# Patient Record
Sex: MALE | Race: WHITE | Employment: OTHER | URBAN - METROPOLITAN AREA
[De-identification: names, ages, dates, MRNs, and addresses within clinical notes are randomized per-mention and may not be internally consistent; named-entity substitution may affect disease eponyms.]

---

## 2020-12-17 ENCOUNTER — ANESTHESIA EVENT (OUTPATIENT)
Dept: SURGERY | Age: 55
End: 2020-12-17
Payer: COMMERCIAL

## 2020-12-18 ENCOUNTER — ANESTHESIA (OUTPATIENT)
Dept: SURGERY | Age: 55
End: 2020-12-18
Payer: COMMERCIAL

## 2020-12-18 ENCOUNTER — HOSPITAL ENCOUNTER (OUTPATIENT)
Age: 55
Setting detail: OUTPATIENT SURGERY
Discharge: HOME | End: 2020-12-18
Attending: ORTHOPAEDIC SURGERY | Admitting: ORTHOPAEDIC SURGERY
Payer: COMMERCIAL

## 2020-12-18 VITALS
SYSTOLIC BLOOD PRESSURE: 144 MMHG | OXYGEN SATURATION: 93 % | WEIGHT: 235 LBS | HEART RATE: 70 BPM | TEMPERATURE: 98.6 F | BODY MASS INDEX: 31 KG/M2 | RESPIRATION RATE: 14 BRPM | DIASTOLIC BLOOD PRESSURE: 79 MMHG

## 2020-12-18 DIAGNOSIS — L76.82 PAIN AT SURGICAL INCISION: Primary | ICD-10-CM

## 2020-12-18 PROCEDURE — 74011250636 HC RX REV CODE- 250/636: Performed by: NURSE ANESTHETIST, CERTIFIED REGISTERED

## 2020-12-18 PROCEDURE — 74011000250 HC RX REV CODE- 250: Performed by: NURSE ANESTHETIST, CERTIFIED REGISTERED

## 2020-12-18 PROCEDURE — 77030027384 HC PRB ARTHSCP SERFAS STRY -C: Performed by: ORTHOPAEDIC SURGERY

## 2020-12-18 PROCEDURE — 77030040361 HC SLV COMPR DVT MDII -B: Performed by: ORTHOPAEDIC SURGERY

## 2020-12-18 PROCEDURE — 2709999900 HC NON-CHARGEABLE SUPPLY: Performed by: ORTHOPAEDIC SURGERY

## 2020-12-18 PROCEDURE — 77030013367: Performed by: ORTHOPAEDIC SURGERY

## 2020-12-18 PROCEDURE — 77030036560 HC SHLDR ARM PAD ABDUCTN S2SG -B: Performed by: ORTHOPAEDIC SURGERY

## 2020-12-18 PROCEDURE — 77030019605: Performed by: ORTHOPAEDIC SURGERY

## 2020-12-18 PROCEDURE — 77030003666 HC NDL SPINAL BD -A: Performed by: ORTHOPAEDIC SURGERY

## 2020-12-18 PROCEDURE — 77030006891 HC BLD SHV RESECT STRY -B: Performed by: ORTHOPAEDIC SURGERY

## 2020-12-18 PROCEDURE — 74011250637 HC RX REV CODE- 250/637: Performed by: ANESTHESIOLOGY

## 2020-12-18 PROCEDURE — 76010010054 HC POST OP PAIN BLOCK: Performed by: ORTHOPAEDIC SURGERY

## 2020-12-18 PROCEDURE — 77030002933 HC SUT MCRYL J&J -A: Performed by: ORTHOPAEDIC SURGERY

## 2020-12-18 PROCEDURE — 77030002960 HC SUT PASS S&N -C: Performed by: ORTHOPAEDIC SURGERY

## 2020-12-18 PROCEDURE — 77030033005 HC TBNG ARTHSC PMP STRY -B: Performed by: ORTHOPAEDIC SURGERY

## 2020-12-18 PROCEDURE — 77030002916 HC SUT ETHLN J&J -A: Performed by: ORTHOPAEDIC SURGERY

## 2020-12-18 PROCEDURE — 76210000020 HC REC RM PH II FIRST 0.5 HR: Performed by: ORTHOPAEDIC SURGERY

## 2020-12-18 PROCEDURE — 76942 ECHO GUIDE FOR BIOPSY: CPT | Performed by: ORTHOPAEDIC SURGERY

## 2020-12-18 PROCEDURE — C1713 ANCHOR/SCREW BN/BN,TIS/BN: HCPCS | Performed by: ORTHOPAEDIC SURGERY

## 2020-12-18 PROCEDURE — 77030003602 HC NDL NRV BLK BBMI -B: Performed by: ANESTHESIOLOGY

## 2020-12-18 PROCEDURE — 74011250636 HC RX REV CODE- 250/636: Performed by: ANESTHESIOLOGY

## 2020-12-18 PROCEDURE — 76060000033 HC ANESTHESIA 1 TO 1.5 HR: Performed by: ORTHOPAEDIC SURGERY

## 2020-12-18 PROCEDURE — 76210000063 HC OR PH I REC FIRST 0.5 HR: Performed by: ORTHOPAEDIC SURGERY

## 2020-12-18 PROCEDURE — 74011250636 HC RX REV CODE- 250/636: Performed by: ORTHOPAEDIC SURGERY

## 2020-12-18 PROCEDURE — 77030006868 HC BLD SHV AUG STRY -B: Performed by: ORTHOPAEDIC SURGERY

## 2020-12-18 PROCEDURE — 76010000161 HC OR TIME 1 TO 1.5 HR INTENSV-TIER 1: Performed by: ORTHOPAEDIC SURGERY

## 2020-12-18 PROCEDURE — 77030010509 HC AIRWY LMA MSK TELE -A: Performed by: ANESTHESIOLOGY

## 2020-12-18 DEVICE — ICONIX SPEED ANCHOR 2.3MM 2 STRANDS #2 FORCE FIBER SUTURE
Type: IMPLANTABLE DEVICE | Site: SHOULDER | Status: FUNCTIONAL
Brand: ICONIX

## 2020-12-18 RX ORDER — ACETAMINOPHEN 500 MG
1000 TABLET ORAL ONCE
Status: COMPLETED | OUTPATIENT
Start: 2020-12-18 | End: 2020-12-18

## 2020-12-18 RX ORDER — DEXAMETHASONE SODIUM PHOSPHATE 4 MG/ML
INJECTION, SOLUTION INTRA-ARTICULAR; INTRALESIONAL; INTRAMUSCULAR; INTRAVENOUS; SOFT TISSUE AS NEEDED
Status: DISCONTINUED | OUTPATIENT
Start: 2020-12-18 | End: 2020-12-18 | Stop reason: HOSPADM

## 2020-12-18 RX ORDER — EPINEPHRINE 1 MG/ML
INJECTION INTRAMUSCULAR; INTRAVENOUS; SUBCUTANEOUS AS NEEDED
Status: DISCONTINUED | OUTPATIENT
Start: 2020-12-18 | End: 2020-12-18 | Stop reason: HOSPADM

## 2020-12-18 RX ORDER — ROPIVACAINE HYDROCHLORIDE 5 MG/ML
INJECTION, SOLUTION EPIDURAL; INFILTRATION; PERINEURAL
Status: COMPLETED | OUTPATIENT
Start: 2020-12-18 | End: 2020-12-18

## 2020-12-18 RX ORDER — MIDAZOLAM HYDROCHLORIDE 1 MG/ML
2 INJECTION, SOLUTION INTRAMUSCULAR; INTRAVENOUS
Status: DISCONTINUED | OUTPATIENT
Start: 2020-12-18 | End: 2020-12-18 | Stop reason: HOSPADM

## 2020-12-18 RX ORDER — HALOPERIDOL 5 MG/ML
1 INJECTION INTRAMUSCULAR
Status: DISCONTINUED | OUTPATIENT
Start: 2020-12-18 | End: 2020-12-18 | Stop reason: HOSPADM

## 2020-12-18 RX ORDER — ONDANSETRON 2 MG/ML
INJECTION INTRAMUSCULAR; INTRAVENOUS AS NEEDED
Status: DISCONTINUED | OUTPATIENT
Start: 2020-12-18 | End: 2020-12-18 | Stop reason: HOSPADM

## 2020-12-18 RX ORDER — SODIUM CHLORIDE, SODIUM LACTATE, POTASSIUM CHLORIDE, CALCIUM CHLORIDE 600; 310; 30; 20 MG/100ML; MG/100ML; MG/100ML; MG/100ML
75 INJECTION, SOLUTION INTRAVENOUS CONTINUOUS
Status: DISCONTINUED | OUTPATIENT
Start: 2020-12-18 | End: 2020-12-18 | Stop reason: HOSPADM

## 2020-12-18 RX ORDER — ONDANSETRON 2 MG/ML
4 INJECTION INTRAMUSCULAR; INTRAVENOUS
Status: DISCONTINUED | OUTPATIENT
Start: 2020-12-18 | End: 2020-12-18 | Stop reason: HOSPADM

## 2020-12-18 RX ORDER — DEXAMETHASONE SODIUM PHOSPHATE 4 MG/ML
INJECTION, SOLUTION INTRA-ARTICULAR; INTRALESIONAL; INTRAMUSCULAR; INTRAVENOUS; SOFT TISSUE
Status: COMPLETED | OUTPATIENT
Start: 2020-12-18 | End: 2020-12-18

## 2020-12-18 RX ORDER — HYDROMORPHONE HYDROCHLORIDE 2 MG/ML
0.5 INJECTION, SOLUTION INTRAMUSCULAR; INTRAVENOUS; SUBCUTANEOUS
Status: DISCONTINUED | OUTPATIENT
Start: 2020-12-18 | End: 2020-12-18 | Stop reason: HOSPADM

## 2020-12-18 RX ORDER — CEFAZOLIN SODIUM/WATER 2 G/20 ML
2 SYRINGE (ML) INTRAVENOUS ONCE
Status: COMPLETED | OUTPATIENT
Start: 2020-12-18 | End: 2020-12-18

## 2020-12-18 RX ORDER — OXYCODONE HYDROCHLORIDE 5 MG/1
5 TABLET ORAL
Status: DISCONTINUED | OUTPATIENT
Start: 2020-12-18 | End: 2020-12-18 | Stop reason: HOSPADM

## 2020-12-18 RX ORDER — MIDAZOLAM HYDROCHLORIDE 1 MG/ML
2 INJECTION, SOLUTION INTRAMUSCULAR; INTRAVENOUS ONCE
Status: COMPLETED | OUTPATIENT
Start: 2020-12-18 | End: 2020-12-18

## 2020-12-18 RX ORDER — LIDOCAINE HYDROCHLORIDE 20 MG/ML
INJECTION, SOLUTION EPIDURAL; INFILTRATION; INTRACAUDAL; PERINEURAL AS NEEDED
Status: DISCONTINUED | OUTPATIENT
Start: 2020-12-18 | End: 2020-12-18 | Stop reason: HOSPADM

## 2020-12-18 RX ORDER — SODIUM CHLORIDE, SODIUM LACTATE, POTASSIUM CHLORIDE, CALCIUM CHLORIDE 600; 310; 30; 20 MG/100ML; MG/100ML; MG/100ML; MG/100ML
25 INJECTION, SOLUTION INTRAVENOUS CONTINUOUS
Status: DISCONTINUED | OUTPATIENT
Start: 2020-12-18 | End: 2020-12-18 | Stop reason: HOSPADM

## 2020-12-18 RX ORDER — OXYCODONE HYDROCHLORIDE 5 MG/1
5 TABLET ORAL
Qty: 24 TAB | Refills: 0 | Status: SHIPPED | OUTPATIENT
Start: 2020-12-18 | End: 2020-12-21

## 2020-12-18 RX ORDER — LIDOCAINE HYDROCHLORIDE 10 MG/ML
0.1 INJECTION INFILTRATION; PERINEURAL AS NEEDED
Status: DISCONTINUED | OUTPATIENT
Start: 2020-12-18 | End: 2020-12-18 | Stop reason: HOSPADM

## 2020-12-18 RX ORDER — NALOXONE HYDROCHLORIDE 0.4 MG/ML
0.2 INJECTION, SOLUTION INTRAMUSCULAR; INTRAVENOUS; SUBCUTANEOUS AS NEEDED
Status: DISCONTINUED | OUTPATIENT
Start: 2020-12-18 | End: 2020-12-18 | Stop reason: HOSPADM

## 2020-12-18 RX ORDER — NALOXONE HYDROCHLORIDE 0.4 MG/ML
0.2 INJECTION, SOLUTION INTRAMUSCULAR; INTRAVENOUS; SUBCUTANEOUS
Status: DISCONTINUED | OUTPATIENT
Start: 2020-12-18 | End: 2020-12-18 | Stop reason: HOSPADM

## 2020-12-18 RX ORDER — PROPOFOL 10 MG/ML
INJECTION, EMULSION INTRAVENOUS AS NEEDED
Status: DISCONTINUED | OUTPATIENT
Start: 2020-12-18 | End: 2020-12-18 | Stop reason: HOSPADM

## 2020-12-18 RX ORDER — FENTANYL CITRATE 50 UG/ML
100 INJECTION, SOLUTION INTRAMUSCULAR; INTRAVENOUS ONCE
Status: COMPLETED | OUTPATIENT
Start: 2020-12-18 | End: 2020-12-18

## 2020-12-18 RX ADMIN — ACETAMINOPHEN 1000 MG: 500 TABLET, FILM COATED ORAL at 09:07

## 2020-12-18 RX ADMIN — FENTANYL CITRATE 100 MCG: 50 INJECTION, SOLUTION INTRAMUSCULAR; INTRAVENOUS at 09:18

## 2020-12-18 RX ADMIN — MIDAZOLAM 2 MG: 1 INJECTION INTRAMUSCULAR; INTRAVENOUS at 09:18

## 2020-12-18 RX ADMIN — LIDOCAINE HYDROCHLORIDE 80 MG: 20 INJECTION, SOLUTION EPIDURAL; INFILTRATION; INTRACAUDAL; PERINEURAL at 11:29

## 2020-12-18 RX ADMIN — Medication 2 G: at 11:38

## 2020-12-18 RX ADMIN — PROPOFOL 200 MG: 10 INJECTION, EMULSION INTRAVENOUS at 11:29

## 2020-12-18 RX ADMIN — SODIUM CHLORIDE, SODIUM LACTATE, POTASSIUM CHLORIDE, AND CALCIUM CHLORIDE: 600; 310; 30; 20 INJECTION, SOLUTION INTRAVENOUS at 12:30

## 2020-12-18 RX ADMIN — DEXAMETHASONE SODIUM PHOSPHATE 10 MG: 4 INJECTION, SOLUTION INTRAMUSCULAR; INTRAVENOUS at 11:43

## 2020-12-18 RX ADMIN — DEXAMETHASONE SODIUM PHOSPHATE 5 MG: 4 INJECTION, SOLUTION INTRAMUSCULAR; INTRAVENOUS at 09:20

## 2020-12-18 RX ADMIN — ONDANSETRON 4 MG: 2 INJECTION INTRAMUSCULAR; INTRAVENOUS at 11:43

## 2020-12-18 RX ADMIN — SODIUM CHLORIDE, SODIUM LACTATE, POTASSIUM CHLORIDE, AND CALCIUM CHLORIDE 25 ML/HR: 600; 310; 30; 20 INJECTION, SOLUTION INTRAVENOUS at 08:30

## 2020-12-18 RX ADMIN — ROPIVACAINE HYDROCHLORIDE 25 ML: 150 INJECTION, SOLUTION EPIDURAL; INFILTRATION; PERINEURAL at 09:20

## 2020-12-18 NOTE — DISCHARGE INSTRUCTIONS
Post-Operative Instructions   For  Sherice Jameson. Shoulder Rotator Cuff Repair   Phone:  [phone]    1. Apply an  Ice pack to the shoulder. 2. You may shower in three days  after the arthroscopy. Keep dressing in place. It is waterproof. 3. Keep the sling on. You had a rotator cuff repair and it must be protected. Do not raise the arm at all. Be very careful with the arm. 5. Use the pain medication as instructed on the bottle. If you have pain medication from another physician do not use it with this medication. 6. You may have some side effects from your pain medication. If you have nausea, try taking your medication with food. For itching, you may take over the counter Benadryl. 7. If you have a problem, please call [facility] at [phone][facility]         [facility] Call your doctor if pain is NOT relieved by medication.   Excessive bleeding that does not stop after holding pressure over the area  · Temperature of 101 degrees F or above  · Excessive redness, swelling or bruising, and/ or green or yellow, smelly discharge from incision      TYPICAL SIDE EFFECTS OF PAIN MEDICATION:     Constipation: Drink lots of fluids. Over the counter stool softener if needed.    Nausea: Take pain medication with food. Call your doctor with persistent nausea. ACTIVITY  · As tolerated and as directed by your doctor. · Bathe or shower as directed by your doctor. DIET  · Day of surgery: Clear liquids until no nausea or vomiting; small portion, light diet Pitkin foods (ex: baked chicken, plain rice, grits, scrambled eggs, toast). Nothing greasy, fried or spicy today. · Advance to regular diet on second day, unless your doctor orders otherwise. · If nausea and vomiting continues, call your doctor. PAIN  · Take pain medication as directed by your doctor.     · DO NOT take aspirin or blood thinners unless directed by your doctor. AFTER ANESTHESIA   · For the first 24 hours: DO NOT Drive, Drink alcoholic beverages, or Make important decisions. · Be aware of dizziness following anesthesia and while taking pain medication. DISCHARGE SUMMARY from Nurse    PATIENT INSTRUCTIONS:    After general anesthesia or intravenous sedation, for 24 hours or while taking prescription Narcotics:  · Limit your activities  · Do not drive and operate hazardous machinery  · Do not make important personal or business decisions  · Do  not drink alcoholic beverages  · If you have not urinated within 8 hours after discharge, please contact your surgeon on call. *  Please give a list of your current medications to your Primary Care Provider. *  Please update this list whenever your medications are discontinued, doses are      changed, or new medications (including over-the-counter products) are added. *  Please carry medication information at all times in case of emergency situations. Preventing Infection at Home  We care about preventing infection and avoiding the spread of germs - not only when you are in the hospital but also when you return home. When you return home from the hospital, its important to take the following steps to help prevent infection and avoid spreading germs that could infect you and others. Ask everyone in your home to follow these guidelines, too. Clean Your Hands  · Clean your hands whenever your hands are visibly dirty, before you eat, before or after touching your mouth, nose or eyes, and before preparing food. Clean them after contact with body fluids, using the restroom, touching animals or changing diapers. · When washing hands, wet them with warm water and work up a lather. Rub hands for at least 15 seconds, then rinse them and pat them dry with a clean towel or paper towel.   · When using hand sanitizers, it should take about 15 seconds to rub your hands dry. If not, you probably didnt apply enough . Cover Your Sneeze or Cough  Germs are released into the air whenever you sneeze or cough. To prevent the spread of infection:  · Turn away from other people before coughing or sneezing. · Cover your mouth or nose with a tissue when you cough or sneeze. Put the tissue in the trash. · If you dont have a tissue, cough or sneeze into your upper sleeve, not your hands. · Always clean your hands after coughing or sneezing. Care for Wounds  Your skin is your bodys first line of defense against germs, but an open wound leaves an easy way for germs to enter your body. To prevent infection:  · Clean your hands before and after changing wound dressings, and wear gloves to change dressings if recommended by your doctor. · Take special care with IV lines or other devices inserted into the body. If you must touch them, clean your hands first.  · Follow any specific instructions from your doctor to care for your wounds. Contact your doctor if you experience any signs of infection, such as fever or increased redness at the surgical or wound site. Keep a Clean Home  · Clean or wipe commonly touched hard surfaces like door handles, sinks, tabletops, phones and TV remotes. · Use products labeled disinfectant to kill harmful bacteria and viruses. · Use a clean cloth or paper towel to clean and dry surfaces. Wiping surfaces with a dirty dishcloth, sponge or towel will only spread germs. · Never share toothbrushes, dejesus, drinking glasses, utensils, razor blades, face cloths or bath towels to avoid spreading germs. · Be sure that the linens that you sleep on are clean. · Keep pets away from wounds and wash your hands after touching pets, their toys or bedding. We care about you and your health. Remember, preventing infections is a team effort between you, your family, friends and health care providers.        These are general instructions for a healthy lifestyle:    No smoking/ No tobacco products/ Avoid exposure to second hand smoke    Surgeon General's Warning:  Quitting smoking now greatly reduces serious risk to your health. Obesity, smoking, and sedentary lifestyle greatly increases your risk for illness    A healthy diet, regular physical exercise & weight monitoring are important for maintaining a healthy lifestyle    You may be retaining fluid if you have a history of heart failure or if you experience any of the following symptoms:  Weight gain of 3 pounds or more overnight or 5 pounds in a week, increased swelling in our hands or feet or shortness of breath while lying flat in bed. Please call your doctor as soon as you notice any of these symptoms; do not wait until your next office visit. Recognize signs and symptoms of STROKE:    F-face looks uneven    A-arms unable to move or move unevenly    S-speech slurred or non-existent    T-time-call 911 as soon as signs and symptoms begin-DO NOT go       Back to bed or wait to see if you get better-TIME IS BRAIN. Advance Care Planning  People with COVID-19 may have no symptoms, mild symptoms, such as fever, cough, and shortness of breath or they may have more severe illness, developing severe and fatal pneumonia. As a result, Advance Care Planning with attention to naming a health care decision maker (someone you trust to make healthcare decisions for you if you could not speak for yourself) and sharing other health care preferences is important BEFORE a possible health crisis. Please contact your Primary Care Provider to discuss Advance Care Planning.      Preventing the Spread of Coronavirus Disease 2019 in Homes and Residential Communities  For the most recent information go to [employer]    Prevention steps for People with confirmed or suspected COVID-19 (including persons under investigation) who do not need to be hospitalized  and   People with confirmed COVID-19 who were hospitalized and determined to be medically stable to go home    Your healthcare provider and public health staff will evaluate whether you can be cared for at home. If it is determined that you do not need to be hospitalized and can be isolated at home, you will be monitored by staff from your local or state health department. You should follow the prevention steps below until a healthcare provider or local or state health department says you can return to your normal activities. Stay home except to get medical care  People who are mildly ill with COVID-19 are able to isolate at home during their illness. You should restrict activities outside your home, except for getting medical care. Do not go to work, school, or public areas. Avoid using public transportation, ride-sharing, or taxis. Separate yourself from other people and animals in your home  People: As much as possible, you should stay in a specific room and away from other people in your home. Also, you should use a separate bathroom, if available. Animals: You should restrict contact with pets and other animals while you are sick with COVID-19, just like you would around other people. Although there have not been reports of pets or other animals becoming sick with COVID-19, it is still recommended that people sick with COVID-19 limit contact with animals until more information is known about the virus. When possible, have another member of your household care for your animals while you are sick. If you are sick with COVID-19, avoid contact with your pet, including petting, snuggling, being kissed or licked, and sharing food. If you must care for your pet or be around animals while you are sick, wash your hands before and after you interact with pets and wear a facemask.   Call ahead before visiting your doctor  If you have a medical appointment, call the healthcare provider and tell them that you have or may have COVID-19. This will help the healthcare providers office take steps to keep other people from getting infected or exposed. Wear a facemask  You should wear a facemask when you are around other people (e.g., sharing a room or vehicle) or pets and before you enter a healthcare providers office. If you are not able to wear a facemask (for example, because it causes trouble breathing), then people who live with you should not stay in the same room with you, or they should wear a facemask if they enter your room. Cover your coughs and sneezes  Cover your mouth and nose with a tissue when you cough or sneeze. Throw used tissues in a lined trash can. Immediately wash your hands with soap and water for at least 20 seconds or, if soap and water are not available, clean your hands with an alcohol-based hand  that contains at least 60% alcohol. Clean your hands often  Wash your hands often with soap and water for at least 20 seconds, especially after blowing your nose, coughing, or sneezing; going to the bathroom; and before eating or preparing food. If soap and water are not readily available, use an alcohol-based hand  with at least 60% alcohol, covering all surfaces of your hands and rubbing them together until they feel dry. Soap and water are the best option if hands are visibly dirty. Avoid touching your eyes, nose, and mouth with unwashed hands. Avoid sharing personal household items  You should not share dishes, drinking glasses, cups, eating utensils, towels, or bedding with other people or pets in your home. After using these items, they should be washed thoroughly with soap and water. Clean all high-touch surfaces everyday  High touch surfaces include counters, tabletops, doorknobs, bathroom fixtures, toilets, phones, keyboards, tablets, and bedside tables. Also, clean any surfaces that may have blood, stool, or body fluids on them.  Use a household cleaning spray or wipe, according to the label instructions. Labels contain instructions for safe and effective use of the cleaning product including precautions you should take when applying the product, such as wearing gloves and making sure you have good ventilation during use of the product. Monitor your symptoms  Seek prompt medical attention if your illness is worsening (e.g., difficulty breathing). Before seeking care, call your healthcare provider and tell them that you have, or are being evaluated for, COVID-19. Put on a facemask before you enter the facility. These steps will help the healthcare providers office to keep other people in the office or waiting room from getting infected or exposed. Ask your healthcare provider to call the local or state health department. Persons who are placed under active monitoring or facilitated self-monitoring should follow instructions provided by their local health department or occupational health professionals, as appropriate. When working with your local health department check their available hours. If you have a medical emergency and need to call 911, notify the dispatch personnel that you have, or are being evaluated for COVID-19. If possible, put on a facemask before emergency medical services arrive. Discontinuing home isolation  Patients with confirmed COVID-19 should remain under home isolation precautions until the risk of secondary transmission to others is thought to be low. The decision to discontinue home isolation precautions should be made on a case-by-case basis, in consultation with healthcare providers and state and local health departments.

## 2020-12-18 NOTE — ANESTHESIA PROCEDURE NOTES
Peripheral Block    Start time: 12/18/2020 9:18 AM  End time: 12/18/2020 9:20 AM  Performed by: Manav Case MD  Authorized by: Manav Case MD       Pre-procedure: Indications: at surgeon's request and post-op pain management    Preanesthetic Checklist: patient identified, risks and benefits discussed, site marked, timeout performed, anesthesia consent given and patient being monitored    Timeout Time: 09:18          Block Type:   Block Type:   Interscalene  Laterality:  Left  Monitoring:  Standard ASA monitoring, responsive to questions, continuous pulse ox, oxygen, frequent vital sign checks and heart rate  Injection Technique:  Single shot  Procedures: ultrasound guided and nerve stimulator    Patient Position: supine  Prep: chlorhexidine    Location:  Interscalene  Needle Type:  Stimuplex  Needle Gauge:  22 G  Needle Localization:  Nerve stimulator and ultrasound guidance  Motor Response comment:   Motor Response: minimal motor response >0.4 mA   Medication Injected:  Ropivacaine (PF) (NAROPIN)(0.5%) 5 mg/mL injection, 25 mL  dexamethasone (DECADRON) 4 mg/mL injection, 5 mg  Med Admin Time: 12/18/2020 9:20 AM    Assessment:  Number of attempts:  1  Injection Assessment:  Incremental injection every 5 mL, negative aspiration for CSF, ultrasound image on chart, no paresthesia, local visualized surrounding nerve on ultrasound, negative aspiration for blood and no intravascular symptoms  Patient tolerance:  Patient tolerated the procedure well with no immediate complications

## 2020-12-18 NOTE — ANESTHESIA POSTPROCEDURE EVALUATION
Procedure(s):  LEFT SHOULDER ARTHROSCOPY ROTATOR CUFF REPAIR/ SUBACROMIAL DECOMPRESSION / DISTAL CLAVICLE RESECTION. general    Anesthesia Post Evaluation      Multimodal analgesia: multimodal analgesia used between 6 hours prior to anesthesia start to PACU discharge  Patient location during evaluation: bedside  Patient participation: complete - patient participated  Level of consciousness: awake and alert  Pain score: 1  Pain management: adequate  Airway patency: patent  Anesthetic complications: no  Cardiovascular status: acceptable  Respiratory status: acceptable  Hydration status: acceptable  Comments: Pt doing well. Ok to d/c home. Post anesthesia nausea and vomiting:  none  Final Post Anesthesia Temperature Assessment:  Normothermia (36.0-37.5 degrees C)      INITIAL Post-op Vital signs:   Vitals Value Taken Time   /74 12/18/20 1256   Temp 36.4 °C (97.5 °F) 12/18/20 1246   Pulse 62 12/18/20 1257   Resp 16 12/18/20 1256   SpO2 96 % 12/18/20 1257   Vitals shown include unvalidated device data.

## 2020-12-18 NOTE — OP NOTES
PATIENT:    George Jimenez. DATE OF SURGERY:  12/18/2020      PREOPERATIVE  DIAGNOSIS:  Strain of muscle(s) and tendon(s) of the rotator cuff of left shoulder, initial encounter [S46.012A]  Bursitis of left shoulder [M75.52]      POSTOPERATIVE DIAGNOSIS:   LEFT SHOULDER - ROTATOR CUFF TEAR/ AC ARTHRITIS/ IMPINGEMENT      PROCEDURE:   Procedure(s):  LEFT SHOULDER ARTHROSCOPY ROTATOR CUFF REPAIR/ SUBACROMIAL DECOMPRESSION / DISTAL CLAVICLE RESECTION      PROCEDURE IN DETAIL:   The patient's left   shoulder  was prepped and draped in a sterile fashion. The arthroscope was inserted through a posterior portal  and the interior of the joint was examined. The labrum was intact. The biceps tendon was normal as well. The articular surfaces were normal. There was a tear of the rotator cuff which was full thickness. It involved the supraspinatus. The scope was then removed from the shoulder joint and then placed into the subacromial space. The bursal tissue was removed and visualization was obtained. The anterior acromion was prominent. A subacromial decompression was performed through the posterior portal using a cutting block technique. This provided a good decompression of the subacromial space. The rotator cuff was examined and a full thickness tear was seen. It was repaired next. The greater tuberosity was prepared with the arthroscopic claudette. The edges of the tendon were freshened as well. It was repaired using three Iconix  anchors. The cuff was repaired back down to the tuberosity. six Odette sutures were used. The cuff tear measured 3 cms in length. The shoulder had good range of motion without undue tension on the repair. The distal clavicle was very degenerative. A distal clavicle resection was done through the anterior portal. The arthroscopic claudette was used to remove approximately 1 centimeter of the bone. The stab wounds were then closed in the standard fashion. A sterile dressing was applied.   A sling with abduction pillow  was placed as well. The patient was then taken to the recovery room in satisfactory condition.           Devkia Anaya M.D.

## 2020-12-18 NOTE — ANESTHESIA PREPROCEDURE EVALUATION
Relevant Problems   No relevant active problems       Anesthetic History   No history of anesthetic complications            Review of Systems / Medical History  Patient summary reviewed and pertinent labs reviewed    Pulmonary  Within defined limits                 Neuro/Psych   Within defined limits           Cardiovascular                  Exercise tolerance: >4 METS  Comments: Denies CP, SOB or changes in functional status   GI/Hepatic/Renal  Within defined limits              Endo/Other        Arthritis     Other Findings              Physical Exam    Airway  Mallampati: II  TM Distance: 4 - 6 cm  Neck ROM: normal range of motion   Mouth opening: Normal     Cardiovascular    Rhythm: regular  Rate: normal         Dental  No notable dental hx       Pulmonary  Breath sounds clear to auscultation               Abdominal  GI exam deferred       Other Findings            Anesthetic Plan    ASA: 2  Anesthesia type: general      Post-op pain plan if not by surgeon: peripheral nerve block single    Induction: Intravenous  Anesthetic plan and risks discussed with: Patient and Spouse

## 2020-12-18 NOTE — H&P
Outpatient Surgery History and Physical:  Alex Delacruz was seen and examined. CHIEF COMPLAINT:   Left shoulder RCR. PE:     Visit Vitals  /74 (BP 1 Location: Right arm, BP Patient Position: At rest)   Pulse 62   Temp 98.4 °F (36.9 °C)   Resp 16   Wt 235 lb (106.6 kg)   SpO2 99%   BMI 31.00 kg/m²       Heart:   Regular rhythm      Lungs:  Are clear      Past Medical History:    Patient Active Problem List    Diagnosis    Pain in joint, multiple sites    Backache, unspecified    Positive sm/RNP antibody       Surgical History:   Past Surgical History:   Procedure Laterality Date    HX WISDOM TEETH EXTRACTION      NEUROLOGICAL PROCEDURE UNLISTED  2000    back surgery lumbar       Social History: Patient  reports that he has never smoked. He has never used smokeless tobacco. He reports previous alcohol use. He reports previous drug use. Family History:   Family History   Problem Relation Age of Onset    Diabetes Father     Hypertension Father        Allergies: Reviewed per EMR  No Known Allergies    Medications:    No current facility-administered medications on file prior to encounter. Current Outpatient Medications on File Prior to Encounter   Medication Sig    gabapentin (NEURONTIN) 800 mg tablet Take 1 Tab by mouth four (4) times daily.  atorvastatin (LIPITOR) 20 mg tablet Take 1 Tab by mouth daily. (Patient taking differently: Take 20 mg by mouth every evening.)    valACYclovir (VALTREX) 500 mg tablet Take 1 Tab by mouth two (2) times a day. (Patient taking differently: Take 500 mg by mouth as needed (cold sore). )    baclofen (LIORESAL) 10 mg tablet Take 1 Tab by mouth two (2) times a day. (Patient taking differently: Take 10 mg by mouth two (2) times daily as needed.)    ibuprofen (MOTRIN) 600 mg tablet Take  by mouth every six (6) hours as needed for Pain.  promethazine (PHENERGAN) 25 mg tablet Take 1 Tab by mouth every six (6) hours as needed for Nausea.  Indications: motion sickness    EPINEPHrine (EPIPEN 2-ALLEN) 0.3 mg/0.3 mL injection 0.3 mL by IntraMUSCular route once as needed for up to 1 dose. (Patient taking differently: 0.3 mg by IntraMUSCular route once as needed. Patient states  \"for possible allergy to honey bees if received multiple stings\")       The surgery is planned for the left shoulder . The patient is here today for outpatient surgery. I have examined the patient, no changes are noted in the patient's medical status. Necessity for the procedure/care is still present and the history and physical above is current. See the office notes for the full long term history of the problem. Please see the recent office notes for the musculoskeletal examination. The risks and the benefits of the surgery have been discussed.     Signed By: Rohini Vieira MD     December 18, 2020 10:47 AM

## 2020-12-18 NOTE — BRIEF OP NOTE
Brief Postoperative Note    Patient: Giorgio Meza. YOB: 1965  MRN: [id number]    Date of Procedure: 12/18/2020     Pre-Op Diagnosis: Strain of muscle(s) and tendon(s) of the rotator cuff of left shoulder, initial encounter [S46.012A]  Bursitis of left shoulder [M75.52]    Post-Op Diagnosis: Same as preoperative diagnosis. Procedure(s):  LEFT SHOULDER ARTHROSCOPY ROTATOR CUFF REPAIR/ SUBACROMIAL DECOMPRESSION / DISTAL CLAVICLE RESECTION    Surgeon(s):  Edilberto Broussard MD    Surgical Assistant: None    Anesthesia: General     Estimated Blood Loss (mL): Minimal    Complications: None    Specimens: * No specimens in log *     Implants:   Implant Name Type Inv.  Item Serial No.  Lot No. LRB No. Used Action   ANCHOR SUT 23MM 2 STRND SELF PUNCHING ICONIX - [id number]  ANCHOR SUT 23MM 2 STRND SELF PUNCHING ICONIX  [employer]WD [id number] Left 2 Implanted   ANCHOR SUT 23MM 2 STRND SELF PUNCHING ICONIX - [id number]  ANCHOR SUT 23MM 2 STRND SELF PUNCHING ICONIX  [employer] [id number] Left 1 Implanted       Drains: * No LDAs found *    Findings:     Electronically Signed by Yuri Sanches MD on 12/18/2020 at 12:36 PM

## 2022-06-15 ENCOUNTER — TELEPHONE (OUTPATIENT)
Dept: ORTHOPEDIC SURGERY | Age: 57
End: 2022-06-15

## 2022-06-15 DIAGNOSIS — M51.36 DDD (DEGENERATIVE DISC DISEASE), LUMBAR: ICD-10-CM

## 2022-06-15 DIAGNOSIS — M48.062 SPINAL STENOSIS OF LUMBAR REGION WITH NEUROGENIC CLAUDICATION: Primary | ICD-10-CM

## 2022-06-15 NOTE — LETTER
Lumbar Spine Injection Precert Authorization Form    Name: Viola Grant : 1965  Age: 56 y.o. Gender: male    Clinical Information    Referring Doctor: Raoul Reese. Diagnosis:     ICD-10-CM    1. Spinal stenosis of lumbar region with neurogenic claudication  M48.062    2. DDD (degenerative disc disease), lumbar  M51.36    . Body Part:  back    Type of Service    [ ] 03713+- CARMEL Caudal or Lumbar    [ ] 74068- Facet Lumbar (single Level)    [ ] 56945- Facet 2nd Level    [ ] 62621- Facet 3 or more level    [ ] 49859- Sacroiliac joint     [ x] 45961- SNRB Transforaminal CARMEL Lumbar or Sacral (single level)x2    [ ] 58323- SNRB add levels Lumbar or Sacral     [ ] 64190- Sciatic Nerve, single. [ ] 66397- Radiofrequency L/S single facet     [ ] 75676- Radiofrequency L/s additional facet       Comments  Rodrick L4-5 TFESI     Insurance:    1st Payor  Payor: / No coverage found.      Secondary Payor if applicable  [unfilled]

## 2022-06-16 ENCOUNTER — TELEPHONE (OUTPATIENT)
Dept: ORTHOPEDIC SURGERY | Age: 57
End: 2022-06-16

## 2022-06-22 ENCOUNTER — OFFICE VISIT (OUTPATIENT)
Dept: ORTHOPEDIC SURGERY | Age: 57
End: 2022-06-22
Payer: COMMERCIAL

## 2022-06-22 DIAGNOSIS — M48.062 SPINAL STENOSIS OF LUMBAR REGION WITH NEUROGENIC CLAUDICATION: Primary | ICD-10-CM

## 2022-06-22 DIAGNOSIS — M51.36 DDD (DEGENERATIVE DISC DISEASE), LUMBAR: ICD-10-CM

## 2022-06-22 PROCEDURE — 64483 NJX AA&/STRD TFRM EPI L/S 1: CPT | Performed by: PHYSICAL MEDICINE & REHABILITATION

## 2022-06-22 RX ORDER — METHYLPREDNISOLONE ACETATE 40 MG/ML
120 INJECTION, SUSPENSION INTRA-ARTICULAR; INTRALESIONAL; INTRAMUSCULAR; SOFT TISSUE ONCE
Status: COMPLETED | OUTPATIENT
Start: 2022-06-22 | End: 2022-06-22

## 2022-06-22 RX ADMIN — METHYLPREDNISOLONE ACETATE 120 MG: 40 INJECTION, SUSPENSION INTRA-ARTICULAR; INTRALESIONAL; INTRAMUSCULAR; SOFT TISSUE at 13:47

## 2022-06-22 NOTE — PROGRESS NOTES
Name: Pattie Ricci. YOB: 1965  Gender: male  MRN: [id number]        Transforaminal [facility] Procedure Note    Procedure: Bilateral  L4-L5 transforaminal epidural steroid injections     Precautions: Pattie Graham denies prior sensitivity to steroid, local anesthetic, iodine, or shellfish. Consent:  Consent was obtained prior to the procedure. The procedure was discussed at length with Pattie Ricci. Carlos Menon He was given the opportunity to ask questions regarding the procedure and its associated risks. In addition to the potential risks associated with the procedure itself, the patient was informed both verbally and in writing of potential side effects of the use glucocorticoids. The patient appeared to comprehend the informed consent and desired to have the procedure performed, and informed consent was signed. He was placed in a prone position on the fluoroscopy table and the skin was prepped and draped in a routine sterile fashion. The areas to be injected were each anesthetized with 1 ml of 1% Lidocaine. A 22 gauge 3.5 inch spinal needle was carefully advanced under fluoroscopic guidance to the right L4-L5 transforaminal space  0.5 ml of 70% of Omnipaque was injected to confirm proper needle placement and absence of subdural or vascular flow Once proper placement was confirmed, 0.5ml of 0.25 marcaine and 60 mg of Depo-Medrol were injected through the spinal needle. The above procedure was then repeated at the Left  L4-L5 transforaminal space using 60 mg of Depo-Medrol. Fluoroscopic guidance was used intermittently over a 10-minute period to insure proper needle placement and his safety. A hard copy of the fluoroscopic image has been placed in his chart and is saved on the C-arm hard drive. He was monitored for 30 minutes after the procedure and discharged home in a stable fashion with a routine follow up. Procedural Diagnosis:     ICD-10-CM    1.  Spinal stenosis of lumbar region with neurogenic claudication  M48.062 FL NERVE BLOCK LUMBOSACRAL 1ST     methylPREDNISolone acetate (DEPO-MEDROL) injection 120 mg   2.  DDD (degenerative disc disease), lumbar  M51.36 FL NERVE BLOCK LUMBOSACRAL 1ST     methylPREDNISolone acetate (DEPO-MEDROL) injection 120 mg      Claretha Mcardle, MD  06/22/22

## 2022-10-24 RX ORDER — GABAPENTIN 800 MG/1
800 TABLET ORAL 4 TIMES DAILY
Qty: 120 TABLET | Refills: 3 | Status: SHIPPED | OUTPATIENT
Start: 2022-10-24 | End: 2022-10-25 | Stop reason: SDUPTHER

## 2022-10-25 DIAGNOSIS — G62.89 OTHER POLYNEUROPATHY: Primary | ICD-10-CM

## 2022-10-25 RX ORDER — GABAPENTIN 800 MG/1
800 TABLET ORAL 4 TIMES DAILY
Qty: 120 TABLET | Refills: 5 | Status: SHIPPED | OUTPATIENT
Start: 2022-10-25 | End: 2022-11-11 | Stop reason: SDUPTHER

## 2022-11-11 ENCOUNTER — OFFICE VISIT (OUTPATIENT)
Dept: FAMILY MEDICINE CLINIC | Facility: CLINIC | Age: 57
End: 2022-11-11
Payer: MEDICARE

## 2022-11-11 VITALS
OXYGEN SATURATION: 98 % | DIASTOLIC BLOOD PRESSURE: 90 MMHG | SYSTOLIC BLOOD PRESSURE: 122 MMHG | HEIGHT: 73 IN | TEMPERATURE: 98.2 F | WEIGHT: 222.4 LBS | HEART RATE: 60 BPM | BODY MASS INDEX: 29.48 KG/M2

## 2022-11-11 DIAGNOSIS — R11.0 NAUSEA: Primary | ICD-10-CM

## 2022-11-11 DIAGNOSIS — G62.89 OTHER POLYNEUROPATHY: ICD-10-CM

## 2022-11-11 DIAGNOSIS — B00.1 HERPES LABIALIS WITHOUT COMPLICATION: ICD-10-CM

## 2022-11-11 DIAGNOSIS — E16.2 HYPOGLYCEMIA: ICD-10-CM

## 2022-11-11 DIAGNOSIS — E78.2 MIXED HYPERLIPIDEMIA: ICD-10-CM

## 2022-11-11 DIAGNOSIS — Z12.5 SCREENING FOR PROSTATE CANCER: ICD-10-CM

## 2022-11-11 DIAGNOSIS — R73.09 ABNORMAL GLUCOSE: ICD-10-CM

## 2022-11-11 PROCEDURE — 3017F COLORECTAL CA SCREEN DOC REV: CPT | Performed by: FAMILY MEDICINE

## 2022-11-11 PROCEDURE — 1036F TOBACCO NON-USER: CPT | Performed by: FAMILY MEDICINE

## 2022-11-11 PROCEDURE — G8427 DOCREV CUR MEDS BY ELIG CLIN: HCPCS | Performed by: FAMILY MEDICINE

## 2022-11-11 PROCEDURE — 99214 OFFICE O/P EST MOD 30 MIN: CPT | Performed by: FAMILY MEDICINE

## 2022-11-11 PROCEDURE — G8419 CALC BMI OUT NRM PARAM NOF/U: HCPCS | Performed by: FAMILY MEDICINE

## 2022-11-11 PROCEDURE — G8484 FLU IMMUNIZE NO ADMIN: HCPCS | Performed by: FAMILY MEDICINE

## 2022-11-11 RX ORDER — VALACYCLOVIR HYDROCHLORIDE 500 MG/1
500 TABLET, FILM COATED ORAL 2 TIMES DAILY
Qty: 180 TABLET | Refills: 3 | Status: SHIPPED | OUTPATIENT
Start: 2022-11-11

## 2022-11-11 RX ORDER — GABAPENTIN 800 MG/1
800 TABLET ORAL 4 TIMES DAILY
Qty: 360 TABLET | Refills: 3 | Status: SHIPPED | OUTPATIENT
Start: 2022-11-11 | End: 2023-11-11

## 2022-11-11 RX ORDER — PROMETHAZINE HYDROCHLORIDE 25 MG/1
25 TABLET ORAL EVERY 6 HOURS PRN
Qty: 270 TABLET | Refills: 3 | Status: SHIPPED | OUTPATIENT
Start: 2022-11-11

## 2022-11-11 ASSESSMENT — PATIENT HEALTH QUESTIONNAIRE - PHQ9
SUM OF ALL RESPONSES TO PHQ QUESTIONS 1-9: 0
SUM OF ALL RESPONSES TO PHQ QUESTIONS 1-9: 0
SUM OF ALL RESPONSES TO PHQ9 QUESTIONS 1 & 2: 0
SUM OF ALL RESPONSES TO PHQ QUESTIONS 1-9: 0
SUM OF ALL RESPONSES TO PHQ QUESTIONS 1-9: 0
1. LITTLE INTEREST OR PLEASURE IN DOING THINGS: 0
2. FEELING DOWN, DEPRESSED OR HOPELESS: 0

## 2022-11-12 ASSESSMENT — ENCOUNTER SYMPTOMS
BLOOD IN STOOL: 0
ABDOMINAL DISTENTION: 0
BACK PAIN: 1
SORE THROAT: 0
NAUSEA: 0
EYE PAIN: 0
COLOR CHANGE: 0
PHOTOPHOBIA: 0
SHORTNESS OF BREATH: 0
ABDOMINAL PAIN: 0
COUGH: 0

## 2022-11-13 NOTE — PROGRESS NOTES
Nelida Putnam.  1965 is a 56 y.o. male ,Established patient, here for evaluation of the following chief complaint(s):   Chief Complaint   Patient presents with    Follow-up     Having problems with shoulders and back in pain. Medication Refill          1. Nausea  -     promethazine (PHENERGAN) 25 MG tablet; Take 1 tablet by mouth every 6 hours as needed for Nausea, Disp-270 tablet, R-3Normal  -     CBC with Auto Differential; Future  -     Comprehensive Metabolic Panel; Future  2. Other polyneuropathy  -     gabapentin (NEURONTIN) 800 MG tablet; Take 1 tablet by mouth 4 times daily. , Disp-360 tablet, R-3Normal  -     CBC with Auto Differential; Future  -     Comprehensive Metabolic Panel; Future  3. Herpes labialis without complication  -     valACYclovir (VALTREX) 500 MG tablet; Take 1 tablet by mouth 2 times daily, Disp-180 tablet, R-3Normal  4. Abnormal glucose  -     Hemoglobin A1C; Future  5. Hypoglycemia  -     Hemoglobin A1C; Future  6. Mixed hyperlipidemia  -     Lipid Panel; Future  7. Screening for prostate cancer  -     PSA Screening; Future    MILD ELEVATION IN BP WITHOUT DIAGNOSIS OF HYPERTENSION---his wife is a nurse and he is advised to monitor bp at home and if remains elevated let me know. He would like to defer ekg today due to time. Return in about 4 weeks (around 12/9/2022). Subjective   SUBJECTIVE/OBJECTIVE:  Medication Refill  Pertinent negatives include no abdominal pain, chest pain, chills, coughing, fever, headaches, joint swelling, myalgias, nausea, rash, sore throat or weakness. Shoulder Pain   The pain is present in the left shoulder, right upper leg and right shoulder. This is a recurrent problem. The current episode started more than 1 month ago. The problem occurs daily. The problem has been unchanged. The pain is at a severity of 2/10. The pain is mild. Pertinent negatives include no fever or joint locking.  The symptoms are aggravated by activity (patient states made worse by moving shoulders). The treatment provided mild relief. Back Pain  This is a chronic (history of spinal stenosis due to remote fall---neurogenic claudication) problem. The current episode started more than 1 year ago. The problem occurs constantly. The problem is unchanged. Pertinent negatives include no abdominal pain, chest pain, dysuria, fever, headaches or weakness. Review of Systems   Constitutional:  Negative for chills and fever. HENT:  Negative for ear pain, hearing loss and sore throat. Eyes:  Negative for photophobia and pain. Respiratory:  Negative for cough and shortness of breath. Cardiovascular:  Negative for chest pain, palpitations and leg swelling. Gastrointestinal:  Negative for abdominal distention, abdominal pain, blood in stool and nausea. Genitourinary:  Negative for dysuria and urgency. Musculoskeletal:  Positive for back pain. Negative for joint swelling and myalgias. Skin:  Negative for color change, pallor and rash. Neurological:  Negative for speech difficulty, weakness, light-headedness and headaches. Hematological:  Negative for adenopathy. Psychiatric/Behavioral:  Negative for agitation, confusion, hallucinations, self-injury and suicidal ideas. Objective   Physical Exam  Constitutional:       Appearance: Normal appearance. HENT:      Head: Normocephalic and atraumatic. Nose: Nose normal.   Eyes:      Extraocular Movements: Extraocular movements intact. Conjunctiva/sclera: Conjunctivae normal.      Pupils: Pupils are equal, round, and reactive to light. Cardiovascular:      Rate and Rhythm: Normal rate and regular rhythm. Pulses: Normal pulses. Heart sounds: Normal heart sounds. Pulmonary:      Effort: Pulmonary effort is normal.      Breath sounds: Normal breath sounds. Abdominal:      General: Abdomen is flat. Bowel sounds are normal.      Palpations: Abdomen is soft.    Skin:     General: Skin is warm and dry. Neurological:      General: No focal deficit present. Mental Status: He is alert and oriented to person, place, and time. Psychiatric:         Mood and Affect: Mood normal.                 An electronic signature was used to authenticate this note.     --Zeyad Torres MD

## 2022-11-15 ENCOUNTER — TELEPHONE (OUTPATIENT)
Dept: ORTHOPEDIC SURGERY | Age: 57
End: 2022-11-15

## 2022-11-16 ENCOUNTER — TELEPHONE (OUTPATIENT)
Dept: ORTHOPEDIC SURGERY | Age: 57
End: 2022-11-16

## 2022-11-16 DIAGNOSIS — M54.17 RADICULOPATHY, LUMBOSACRAL REGION: ICD-10-CM

## 2022-11-16 DIAGNOSIS — M51.36 DDD (DEGENERATIVE DISC DISEASE), LUMBAR: ICD-10-CM

## 2022-11-16 DIAGNOSIS — M48.062 SPINAL STENOSIS OF LUMBAR REGION WITH NEUROGENIC CLAUDICATION: Primary | ICD-10-CM

## 2022-11-21 ENCOUNTER — OFFICE VISIT (OUTPATIENT)
Dept: ORTHOPEDIC SURGERY | Age: 57
End: 2022-11-21

## 2022-11-21 DIAGNOSIS — M51.36 DDD (DEGENERATIVE DISC DISEASE), LUMBAR: ICD-10-CM

## 2022-11-21 DIAGNOSIS — M48.062 SPINAL STENOSIS OF LUMBAR REGION WITH NEUROGENIC CLAUDICATION: Primary | ICD-10-CM

## 2022-11-21 DIAGNOSIS — M54.17 RADICULOPATHY, LUMBOSACRAL REGION: ICD-10-CM

## 2022-11-21 RX ORDER — METHYLPREDNISOLONE ACETATE 80 MG/ML
80 INJECTION, SUSPENSION INTRA-ARTICULAR; INTRALESIONAL; INTRAMUSCULAR; SOFT TISSUE ONCE
Status: COMPLETED | OUTPATIENT
Start: 2022-11-21 | End: 2022-11-21

## 2022-11-21 RX ADMIN — METHYLPREDNISOLONE ACETATE 80 MG: 80 INJECTION, SUSPENSION INTRA-ARTICULAR; INTRALESIONAL; INTRAMUSCULAR; SOFT TISSUE at 12:46

## 2022-11-21 NOTE — PROGRESS NOTES
Name: Jojo Babb. YOB: 1965  Gender: male  MRN: [id number]        Transforaminal [facility] Procedure Note    Procedure: Bilateral  L4-L5 transforaminal epidural steroid injections     Precautions: Jojo Babb. denies prior sensitivity to steroid, local anesthetic, iodine, or shellfish. Consent:  Consent was obtained prior to the procedure. The procedure was discussed at length with Jojo Babb. Russell Hidden He was given the opportunity to ask questions regarding the procedure and its associated risks. In addition to the potential risks associated with the procedure itself, the patient was informed both verbally and in writing of potential side effects of the use glucocorticoids. The patient appeared to comprehend the informed consent and desired to have the procedure performed, and informed consent was signed. He was placed in a prone position on the fluoroscopy table and the skin was prepped and draped in a routine sterile fashion. The areas to be injected were each anesthetized with 1 ml of 1% Lidocaine. A 22 gauge 3.5 inch spinal needle was carefully advanced under fluoroscopic guidance to the right L4-L5 transforaminal space  0.5 ml of 70% of Omnipaque was injected to confirm proper needle placement and absence of subdural or vascular flow Once proper placement was confirmed, 0.5ml of 0.25 marcaine and 40 mg of depomedrol were injected through the spinal needle. The above procedure was then repeated at the Left  L4-L5 transforaminal space using 40 mg of depomedrol. Fluoroscopic guidance was used intermittently over a 10-minute period to insure proper needle placement and his safety. A hard copy of the fluoroscopic image has been placed in his chart and is saved on the C-arm hard drive. He was monitored for 30 minutes after the procedure and discharged home in a stable fashion with a routine follow up. Procedural Diagnosis:     ICD-10-CM    1.  Spinal stenosis of lumbar region with neurogenic claudication  M48.062 FL NERVE BLOCK LUMBOSACRAL 1ST     methylPREDNISolone acetate (DEPO-MEDROL) injection 80 mg      2. DDD (degenerative disc disease), lumbar  M51.36 FL NERVE BLOCK LUMBOSACRAL 1ST     methylPREDNISolone acetate (DEPO-MEDROL) injection 80 mg      3.  Radiculopathy, lumbosacral region  M54.17 FL NERVE BLOCK LUMBOSACRAL 1ST     methylPREDNISolone acetate (DEPO-MEDROL) injection 80 mg         Silke Valladares MD  11/21/22

## 2022-11-29 ENCOUNTER — OFFICE VISIT (OUTPATIENT)
Dept: FAMILY MEDICINE CLINIC | Facility: CLINIC | Age: 57
End: 2022-11-29
Payer: MEDICARE

## 2022-11-29 VITALS
HEART RATE: 60 BPM | TEMPERATURE: 97.6 F | BODY MASS INDEX: 29.37 KG/M2 | SYSTOLIC BLOOD PRESSURE: 134 MMHG | WEIGHT: 221.6 LBS | DIASTOLIC BLOOD PRESSURE: 100 MMHG | HEIGHT: 73 IN | OXYGEN SATURATION: 98 %

## 2022-11-29 DIAGNOSIS — E78.2 MIXED HYPERLIPIDEMIA: ICD-10-CM

## 2022-11-29 DIAGNOSIS — B96.89 ACUTE BACTERIAL SINUSITIS: ICD-10-CM

## 2022-11-29 DIAGNOSIS — R11.0 NAUSEA: ICD-10-CM

## 2022-11-29 DIAGNOSIS — E16.2 HYPOGLYCEMIA: ICD-10-CM

## 2022-11-29 DIAGNOSIS — J01.90 ACUTE BACTERIAL SINUSITIS: ICD-10-CM

## 2022-11-29 DIAGNOSIS — I10 PRIMARY HYPERTENSION: Primary | ICD-10-CM

## 2022-11-29 DIAGNOSIS — G62.89 OTHER POLYNEUROPATHY: ICD-10-CM

## 2022-11-29 DIAGNOSIS — R73.09 ABNORMAL GLUCOSE: ICD-10-CM

## 2022-11-29 DIAGNOSIS — Z12.5 SCREENING FOR PROSTATE CANCER: ICD-10-CM

## 2022-11-29 LAB
ALBUMIN SERPL-MCNC: 4.6 G/DL (ref 3.5–5)
ALBUMIN/GLOB SERPL: 1.5 {RATIO} (ref 0.4–1.6)
ALP SERPL-CCNC: 79 U/L (ref 50–136)
ALT SERPL-CCNC: 37 U/L (ref 12–65)
ANION GAP SERPL CALC-SCNC: 6 MMOL/L (ref 2–11)
AST SERPL-CCNC: 23 U/L (ref 15–37)
BASOPHILS # BLD: 0.1 K/UL (ref 0–0.2)
BASOPHILS NFR BLD: 1 % (ref 0–2)
BILIRUB SERPL-MCNC: 0.5 MG/DL (ref 0.2–1.1)
BUN SERPL-MCNC: 17 MG/DL (ref 6–23)
CALCIUM SERPL-MCNC: 9.7 MG/DL (ref 8.3–10.4)
CHLORIDE SERPL-SCNC: 105 MMOL/L (ref 101–110)
CHOLEST SERPL-MCNC: 312 MG/DL
CO2 SERPL-SCNC: 28 MMOL/L (ref 21–32)
CREAT SERPL-MCNC: 1.1 MG/DL (ref 0.8–1.5)
DIFFERENTIAL METHOD BLD: ABNORMAL
EOSINOPHIL # BLD: 0.2 K/UL (ref 0–0.8)
EOSINOPHIL NFR BLD: 4 % (ref 0.5–7.8)
ERYTHROCYTE [DISTWIDTH] IN BLOOD BY AUTOMATED COUNT: 12.4 % (ref 11.9–14.6)
EST. AVERAGE GLUCOSE BLD GHB EST-MCNC: 108 MG/DL
GLOBULIN SER CALC-MCNC: 3 G/DL (ref 2.8–4.5)
GLUCOSE SERPL-MCNC: 90 MG/DL (ref 65–100)
HBA1C MFR BLD: 5.4 % (ref 4.8–5.6)
HCT VFR BLD AUTO: 54.5 % (ref 41.1–50.3)
HDLC SERPL-MCNC: 72 MG/DL (ref 40–60)
HDLC SERPL: 4.3 {RATIO}
HGB BLD-MCNC: 18.4 G/DL (ref 13.6–17.2)
IMM GRANULOCYTES # BLD AUTO: 0 K/UL (ref 0–0.5)
IMM GRANULOCYTES NFR BLD AUTO: 0 % (ref 0–5)
LDLC SERPL CALC-MCNC: 196.8 MG/DL
LYMPHOCYTES # BLD: 1.3 K/UL (ref 0.5–4.6)
LYMPHOCYTES NFR BLD: 25 % (ref 13–44)
MCH RBC QN AUTO: 31.3 PG (ref 26.1–32.9)
MCHC RBC AUTO-ENTMCNC: 33.8 G/DL (ref 31.4–35)
MCV RBC AUTO: 92.7 FL (ref 82–102)
MONOCYTES # BLD: 0.6 K/UL (ref 0.1–1.3)
MONOCYTES NFR BLD: 11 % (ref 4–12)
NEUTS SEG # BLD: 3.2 K/UL (ref 1.7–8.2)
NEUTS SEG NFR BLD: 59 % (ref 43–78)
NRBC # BLD: 0 K/UL (ref 0–0.2)
PLATELET # BLD AUTO: 209 K/UL (ref 150–450)
PMV BLD AUTO: 10 FL (ref 9.4–12.3)
POTASSIUM SERPL-SCNC: 4.8 MMOL/L (ref 3.5–5.1)
PROT SERPL-MCNC: 7.6 G/DL (ref 6.3–8.2)
PSA SERPL-MCNC: 0.8 NG/ML
RBC # BLD AUTO: 5.88 M/UL (ref 4.23–5.6)
SODIUM SERPL-SCNC: 139 MMOL/L (ref 133–143)
TRIGL SERPL-MCNC: 216 MG/DL (ref 35–150)
VLDLC SERPL CALC-MCNC: 43.2 MG/DL (ref 6–23)
WBC # BLD AUTO: 5.5 K/UL (ref 4.3–11.1)

## 2022-11-29 PROCEDURE — 3074F SYST BP LT 130 MM HG: CPT | Performed by: FAMILY MEDICINE

## 2022-11-29 PROCEDURE — G8419 CALC BMI OUT NRM PARAM NOF/U: HCPCS | Performed by: FAMILY MEDICINE

## 2022-11-29 PROCEDURE — 99213 OFFICE O/P EST LOW 20 MIN: CPT | Performed by: FAMILY MEDICINE

## 2022-11-29 PROCEDURE — G8428 CUR MEDS NOT DOCUMENT: HCPCS | Performed by: FAMILY MEDICINE

## 2022-11-29 PROCEDURE — 1036F TOBACCO NON-USER: CPT | Performed by: FAMILY MEDICINE

## 2022-11-29 PROCEDURE — 3017F COLORECTAL CA SCREEN DOC REV: CPT | Performed by: FAMILY MEDICINE

## 2022-11-29 PROCEDURE — G8484 FLU IMMUNIZE NO ADMIN: HCPCS | Performed by: FAMILY MEDICINE

## 2022-11-29 PROCEDURE — 93000 ELECTROCARDIOGRAM COMPLETE: CPT | Performed by: FAMILY MEDICINE

## 2022-11-29 PROCEDURE — 3079F DIAST BP 80-89 MM HG: CPT | Performed by: FAMILY MEDICINE

## 2022-11-29 RX ORDER — LOSARTAN POTASSIUM 50 MG/1
50 TABLET ORAL DAILY
Qty: 30 TABLET | Refills: 5 | Status: SHIPPED | OUTPATIENT
Start: 2022-11-29

## 2022-11-29 RX ORDER — AMOXICILLIN 500 MG/1
500 CAPSULE ORAL 3 TIMES DAILY
Qty: 30 CAPSULE | Refills: 1 | Status: SHIPPED | OUTPATIENT
Start: 2022-11-29 | End: 2022-12-19

## 2022-11-29 ASSESSMENT — ENCOUNTER SYMPTOMS
PHOTOPHOBIA: 0
ABDOMINAL DISTENTION: 0
NAUSEA: 0
BLURRED VISION: 0
EYE PAIN: 0
BLOOD IN STOOL: 0
SINUS COMPLAINT: 1
COUGH: 0
SHORTNESS OF BREATH: 0
ABDOMINAL PAIN: 0
SORE THROAT: 0
COLOR CHANGE: 0

## 2022-11-29 ASSESSMENT — PATIENT HEALTH QUESTIONNAIRE - PHQ9
SUM OF ALL RESPONSES TO PHQ QUESTIONS 1-9: 0
1. LITTLE INTEREST OR PLEASURE IN DOING THINGS: 0
2. FEELING DOWN, DEPRESSED OR HOPELESS: 0
SUM OF ALL RESPONSES TO PHQ QUESTIONS 1-9: 0
SUM OF ALL RESPONSES TO PHQ QUESTIONS 1-9: 0
SUM OF ALL RESPONSES TO PHQ9 QUESTIONS 1 & 2: 0
SUM OF ALL RESPONSES TO PHQ QUESTIONS 1-9: 0

## 2022-11-30 NOTE — PROGRESS NOTES
Jerod Mejia.  1965 is a 56 y.o. male ,Established patient, here for evaluation of the following chief complaint(s):   Chief Complaint   Patient presents with    Follow-up     2 week-  pt is fasting, talked last time about doing an EKG. 1. Primary hypertension--START LOSARTAN 50 MG DAILY  -     EKG 12 Lead--sinus bradycardia--rate 57--rsr' no acute change normal ekg.  -     losartan (COZAAR) 50 MG tablet; Take 1 tablet by mouth daily, Disp-30 tablet, R-5Normal  -     amoxicillin (AMOXIL) 500 MG capsule; Take 1 capsule by mouth 3 times daily for 20 days, Disp-30 capsule, R-1Normal  2. Acute bacterial sinusitis  -     amoxicillin (AMOXIL) 500 MG capsule; Take 1 capsule by mouth 3 times daily for 20 days, Disp-30 capsule, R-1Normal  3. Abnormal glucose  -     Hemoglobin A1C  4. Hypoglycemia  -     Hemoglobin A1C  5. Screening for prostate cancer  -     PSA Screening  6. Mixed hyperlipidemia  -     Lipid Panel  7. Other polyneuropathy  -     CBC with Auto Differential  -     Comprehensive Metabolic Panel  8. Nausea  -     CBC with Auto Differential  -     Comprehensive Metabolic Panel    CHRONIC LOW BACK PAIN----FOLLOWED BY DR Jam Mir. Return in about 3 months (around 2/28/2023). Subjective   SUBJECTIVE/OBJECTIVE:  Hypertension  This is a new problem. The current episode started more than 1 month ago. The problem is unchanged. The problem is resistant. Pertinent negatives include no anxiety, blurred vision, chest pain, headaches, palpitations, peripheral edema, PND, shortness of breath or sweats. Sinus Problem  This is a recurrent problem. The current episode started 1 to 4 weeks ago. The problem is unchanged. His pain is at a severity of 1/10. Pertinent negatives include no chills, coughing, ear pain, headaches, shortness of breath or sore throat. Review of Systems   Constitutional:  Negative for chills and fever. HENT:  Negative for ear pain, hearing loss and sore throat. Eyes:  Negative for blurred vision, photophobia and pain. Respiratory:  Negative for cough and shortness of breath. Cardiovascular:  Negative for chest pain, palpitations, leg swelling and PND. Gastrointestinal:  Negative for abdominal distention, abdominal pain, blood in stool and nausea. Genitourinary:  Negative for dysuria and urgency. Musculoskeletal:  Negative for joint swelling and myalgias. Skin:  Negative for color change, pallor and rash. Neurological:  Negative for speech difficulty, weakness, light-headedness and headaches. Hematological:  Negative for adenopathy. Psychiatric/Behavioral:  Negative for agitation, confusion, hallucinations, self-injury and suicidal ideas. Objective   Physical Exam  Constitutional:       Appearance: Normal appearance. HENT:      Head: Normocephalic and atraumatic. Nose: Nose normal.   Eyes:      Extraocular Movements: Extraocular movements intact. Conjunctiva/sclera: Conjunctivae normal.      Pupils: Pupils are equal, round, and reactive to light. Cardiovascular:      Rate and Rhythm: Normal rate and regular rhythm. Pulses: Normal pulses. Heart sounds: Normal heart sounds. Pulmonary:      Effort: Pulmonary effort is normal.      Breath sounds: Normal breath sounds. Abdominal:      General: Abdomen is flat. Bowel sounds are normal.      Palpations: Abdomen is soft. Skin:     General: Skin is warm and dry. Neurological:      General: No focal deficit present. Mental Status: He is alert and oriented to person, place, and time. Psychiatric:         Mood and Affect: Mood normal.                 An electronic signature was used to authenticate this note.     --Mayco Walton MD

## 2022-12-01 DIAGNOSIS — E78.49 OTHER HYPERLIPIDEMIA: Primary | ICD-10-CM

## 2022-12-01 RX ORDER — ROSUVASTATIN CALCIUM 10 MG/1
10 TABLET, COATED ORAL NIGHTLY
Qty: 30 TABLET | Refills: 3 | Status: SHIPPED | OUTPATIENT
Start: 2022-12-01

## 2022-12-02 ENCOUNTER — TELEPHONE (OUTPATIENT)
Dept: FAMILY MEDICINE CLINIC | Facility: CLINIC | Age: 57
End: 2022-12-02

## 2022-12-02 NOTE — TELEPHONE ENCOUNTER
----- Message from Domingosushma Freddy sent at 12/2/2022  9:21 AM EST -----  Subject: Message to Provider    QUESTIONS  Information for Provider? Pt calling to check about his appt on 1/10, as   he got a call saying it was a VV and he scheduled it to have blood drawn   for test. Please call with information  ---------------------------------------------------------------------------  --------------  Reggie Dejesus INFO  [phone]; OK to leave message on voicemail  ---------------------------------------------------------------------------  --------------  SCRIPT ANSWERS  Relationship to Patient?  Self

## 2023-01-10 ENCOUNTER — NURSE ONLY (OUTPATIENT)
Dept: FAMILY MEDICINE CLINIC | Facility: CLINIC | Age: 58
End: 2023-01-10

## 2023-01-10 ENCOUNTER — CLINICAL DOCUMENTATION (OUTPATIENT)
Dept: FAMILY MEDICINE CLINIC | Facility: CLINIC | Age: 58
End: 2023-01-10

## 2023-01-10 DIAGNOSIS — D75.1 POLYCYTHEMIA: ICD-10-CM

## 2023-01-10 DIAGNOSIS — E78.2 MIXED HYPERLIPIDEMIA: Primary | ICD-10-CM

## 2023-01-10 LAB — HGB BLD-MCNC: 16.8 G/DL (ref 13.6–17.2)

## 2023-01-10 NOTE — PROGRESS NOTES
Patient presents today for bp recheck----130/90------INCREASE COZAAR TO 100MG--------HE CAN TAKE TWO TABS OF THE 50 AND CALL ME WHEN HE NEEDS THE 100MG

## 2023-01-11 LAB
CHOLEST SERPL-MCNC: 193 MG/DL
HDLC SERPL-MCNC: 64 MG/DL (ref 40–60)
HDLC SERPL: 3
LDLC SERPL CALC-MCNC: 95.6 MG/DL
TRIGL SERPL-MCNC: 167 MG/DL (ref 35–150)
VLDLC SERPL CALC-MCNC: 33.4 MG/DL (ref 6–23)

## 2023-01-23 ENCOUNTER — TELEPHONE (OUTPATIENT)
Dept: ORTHOPEDIC SURGERY | Age: 58
End: 2023-01-23

## 2023-01-23 DIAGNOSIS — M54.17 RADICULOPATHY, LUMBOSACRAL REGION: ICD-10-CM

## 2023-01-23 DIAGNOSIS — M48.062 SPINAL STENOSIS OF LUMBAR REGION WITH NEUROGENIC CLAUDICATION: Primary | ICD-10-CM

## 2023-01-23 DIAGNOSIS — M51.36 DDD (DEGENERATIVE DISC DISEASE), LUMBAR: ICD-10-CM

## 2023-01-24 ENCOUNTER — OFFICE VISIT (OUTPATIENT)
Dept: ORTHOPEDIC SURGERY | Age: 58
End: 2023-01-24

## 2023-01-24 DIAGNOSIS — M51.36 OTHER INTERVERTEBRAL DISC DEGENERATION, LUMBAR REGION: ICD-10-CM

## 2023-01-24 DIAGNOSIS — M51.36 DDD (DEGENERATIVE DISC DISEASE), LUMBAR: ICD-10-CM

## 2023-01-24 DIAGNOSIS — M47.817 SPONDYLOSIS WITHOUT MYELOPATHY OR RADICULOPATHY, LUMBOSACRAL REGION: Primary | ICD-10-CM

## 2023-01-24 DIAGNOSIS — M54.17 RADICULOPATHY, LUMBOSACRAL REGION: ICD-10-CM

## 2023-01-24 DIAGNOSIS — M48.062 SPINAL STENOSIS OF LUMBAR REGION WITH NEUROGENIC CLAUDICATION: ICD-10-CM

## 2023-01-24 RX ORDER — TRIAMCINOLONE ACETONIDE 40 MG/ML
40 INJECTION, SUSPENSION INTRA-ARTICULAR; INTRAMUSCULAR ONCE
Status: COMPLETED | OUTPATIENT
Start: 2023-01-24 | End: 2023-01-24

## 2023-01-24 RX ADMIN — TRIAMCINOLONE ACETONIDE 40 MG: 40 INJECTION, SUSPENSION INTRA-ARTICULAR; INTRAMUSCULAR at 16:30

## 2023-01-24 NOTE — PROGRESS NOTES
Name: Chang Laird. YOB: 1965  Gender: male  MRN: [id number]        Transforaminal [facility] Procedure Note    Procedure: Bilateral  L4-L5 transforaminal epidural steroid injections     Precautions: Chang Laird. denies prior sensitivity to steroid, local anesthetic, iodine, or shellfish. Consent:  Consent was obtained prior to the procedure. The procedure was discussed at length with Chang Laird. Radha Stratton He was given the opportunity to ask questions regarding the procedure and its associated risks. In addition to the potential risks associated with the procedure itself, the patient was informed both verbally and in writing of potential side effects of the use glucocorticoids. The patient appeared to comprehend the informed consent and desired to have the procedure performed, and informed consent was signed. He was placed in a prone position on the fluoroscopy table and the skin was prepped and draped in a routine sterile fashion. The areas to be injected were each anesthetized with 1 ml of 1% Lidocaine. A 22 gauge 3.5 inch spinal needle was carefully advanced under fluoroscopic guidance to the right L4-L5 transforaminal space  0.5 ml of 70% of Omnipaque was injected to confirm proper needle placement and absence of subdural or vascular flow Once proper placement was confirmed, 0.5ml of 0.25 marcaine and 40 mg kenalog were injected through the spinal needle. The above procedure was then repeated at the Left  L4-L5 transforaminal space. Fluoroscopic guidance was used intermittently over a 10-minute period to insure proper needle placement and his safety. A hard copy of the fluoroscopic image has been placed in his chart and is saved on the C-arm hard drive. He was monitored for 30 minutes after the procedure and discharged home in a stable fashion with a routine follow up. Procedural Diagnosis:     ICD-10-CM    1.  Spondylosis without myelopathy or radiculopathy, lumbosacral region  M47.817 FL NERVE BLOCK LUMBOSACRAL 1ST     triamcinolone acetonide (KENALOG-40) injection 40 mg      2. Spinal stenosis of lumbar region with neurogenic claudication  M48.062 FL NERVE BLOCK LUMBOSACRAL 1ST     triamcinolone acetonide (KENALOG-40) injection 40 mg      3. DDD (degenerative disc disease), lumbar  M51.36 FL NERVE BLOCK LUMBOSACRAL 1ST     triamcinolone acetonide (KENALOG-40) injection 40 mg      4. Radiculopathy, lumbosacral region  M54.17 FL NERVE BLOCK LUMBOSACRAL 1ST     triamcinolone acetonide (KENALOG-40) injection 40 mg      5.  Other intervertebral disc degeneration, lumbar region  M51.36 FL NERVE BLOCK LUMBOSACRAL 1ST     triamcinolone acetonide (KENALOG-40) injection 40 mg         Marci Kenny MD  01/24/23

## 2023-02-02 DIAGNOSIS — R11.0 NAUSEA: ICD-10-CM

## 2023-02-02 DIAGNOSIS — I10 PRIMARY HYPERTENSION: ICD-10-CM

## 2023-02-02 DIAGNOSIS — I10 PRIMARY HYPERTENSION: Primary | ICD-10-CM

## 2023-02-02 RX ORDER — LOSARTAN POTASSIUM 100 MG/1
100 TABLET ORAL DAILY
Qty: 90 TABLET | Refills: 4 | Status: SHIPPED | OUTPATIENT
Start: 2023-02-02 | End: 2023-02-02 | Stop reason: SDUPTHER

## 2023-02-02 RX ORDER — LOSARTAN POTASSIUM 100 MG/1
100 TABLET ORAL DAILY
Qty: 30 TABLET | Refills: 0 | Status: SHIPPED | OUTPATIENT
Start: 2023-02-02

## 2023-02-02 RX ORDER — PROMETHAZINE HYDROCHLORIDE 25 MG/1
25 TABLET ORAL DAILY PRN
Qty: 30 TABLET | Refills: 3 | Status: SHIPPED | OUTPATIENT
Start: 2023-02-02

## 2023-02-27 SDOH — ECONOMIC STABILITY: INCOME INSECURITY: HOW HARD IS IT FOR YOU TO PAY FOR THE VERY BASICS LIKE FOOD, HOUSING, MEDICAL CARE, AND HEATING?: PATIENT DECLINED

## 2023-02-27 SDOH — ECONOMIC STABILITY: HOUSING INSECURITY
IN THE LAST 12 MONTHS, WAS THERE A TIME WHEN YOU DID NOT HAVE A STEADY PLACE TO SLEEP OR SLEPT IN A SHELTER (INCLUDING NOW)?: PATIENT REFUSED

## 2023-02-27 SDOH — ECONOMIC STABILITY: FOOD INSECURITY: WITHIN THE PAST 12 MONTHS, YOU WORRIED THAT YOUR FOOD WOULD RUN OUT BEFORE YOU GOT MONEY TO BUY MORE.: PATIENT DECLINED

## 2023-02-27 SDOH — ECONOMIC STABILITY: TRANSPORTATION INSECURITY
IN THE PAST 12 MONTHS, HAS LACK OF TRANSPORTATION KEPT YOU FROM MEETINGS, WORK, OR FROM GETTING THINGS NEEDED FOR DAILY LIVING?: PATIENT DECLINED

## 2023-02-27 SDOH — ECONOMIC STABILITY: FOOD INSECURITY: WITHIN THE PAST 12 MONTHS, THE FOOD YOU BOUGHT JUST DIDN'T LAST AND YOU DIDN'T HAVE MONEY TO GET MORE.: PATIENT DECLINED

## 2023-03-01 ENCOUNTER — OFFICE VISIT (OUTPATIENT)
Dept: FAMILY MEDICINE CLINIC | Facility: CLINIC | Age: 58
End: 2023-03-01
Payer: MEDICARE

## 2023-03-01 VITALS
TEMPERATURE: 97.8 F | SYSTOLIC BLOOD PRESSURE: 124 MMHG | OXYGEN SATURATION: 99 % | DIASTOLIC BLOOD PRESSURE: 84 MMHG | HEIGHT: 73 IN | BODY MASS INDEX: 29.66 KG/M2 | HEART RATE: 52 BPM | WEIGHT: 223.8 LBS

## 2023-03-01 DIAGNOSIS — E78.49 OTHER HYPERLIPIDEMIA: ICD-10-CM

## 2023-03-01 DIAGNOSIS — I83.813 VARICOSE VEINS OF BOTH LOWER EXTREMITIES WITH PAIN: Primary | ICD-10-CM

## 2023-03-01 DIAGNOSIS — R22.41 LOCALIZED SWELLING OF RIGHT LOWER LEG: ICD-10-CM

## 2023-03-01 DIAGNOSIS — M79.604 RIGHT LEG PAIN: ICD-10-CM

## 2023-03-01 PROCEDURE — G8427 DOCREV CUR MEDS BY ELIG CLIN: HCPCS | Performed by: FAMILY MEDICINE

## 2023-03-01 PROCEDURE — 99213 OFFICE O/P EST LOW 20 MIN: CPT | Performed by: FAMILY MEDICINE

## 2023-03-01 PROCEDURE — 3017F COLORECTAL CA SCREEN DOC REV: CPT | Performed by: FAMILY MEDICINE

## 2023-03-01 PROCEDURE — G8419 CALC BMI OUT NRM PARAM NOF/U: HCPCS | Performed by: FAMILY MEDICINE

## 2023-03-01 PROCEDURE — 1036F TOBACCO NON-USER: CPT | Performed by: FAMILY MEDICINE

## 2023-03-01 PROCEDURE — G8484 FLU IMMUNIZE NO ADMIN: HCPCS | Performed by: FAMILY MEDICINE

## 2023-03-01 RX ORDER — ROSUVASTATIN CALCIUM 10 MG/1
10 TABLET, COATED ORAL NIGHTLY
Qty: 90 TABLET | Refills: 3 | Status: SHIPPED | OUTPATIENT
Start: 2023-03-01

## 2023-03-01 ASSESSMENT — ENCOUNTER SYMPTOMS
PHOTOPHOBIA: 0
NAUSEA: 0
BACK PAIN: 1
COUGH: 0
SORE THROAT: 0
BLOOD IN STOOL: 0
SHORTNESS OF BREATH: 0
BLURRED VISION: 0
COLOR CHANGE: 0
ABDOMINAL PAIN: 0
EYE PAIN: 0
ABDOMINAL DISTENTION: 0

## 2023-03-01 ASSESSMENT — PATIENT HEALTH QUESTIONNAIRE - PHQ9
1. LITTLE INTEREST OR PLEASURE IN DOING THINGS: 0
SUM OF ALL RESPONSES TO PHQ9 QUESTIONS 1 & 2: 0
SUM OF ALL RESPONSES TO PHQ QUESTIONS 1-9: 0
2. FEELING DOWN, DEPRESSED OR HOPELESS: 0
SUM OF ALL RESPONSES TO PHQ QUESTIONS 1-9: 0

## 2023-03-02 NOTE — PROGRESS NOTES
Сергей Serrano.  1965 is a 56 y.o. male ,Established patient, here for evaluation of the following chief complaint(s):   Chief Complaint   Patient presents with    3 Month Follow-Up     Pt is fasting- having some problems with the veins in his legs and they are hurting and itching and would like someone to look at them. 1. Varicose veins of both lower extremities with pain  -     Vascular duplex lower extremity venous right; Future  -     [facility]  2. Other hyperlipidemia  -     rosuvastatin (CRESTOR) 10 MG tablet; Take 1 tablet by mouth nightly, Disp-90 tablet, R-3Normal  3. Right leg pain  -     Vascular duplex lower extremity venous right; Future  -     [facility]  4. Localized swelling of right lower leg  -     Vascular duplex lower extremity venous right; Future  -     [facility]        Return in about 3 months (around 6/1/2023). Subjective   SUBJECTIVE/OBJECTIVE:  CONCERNED ABOUT VEINS IN THE LEGS        Back Pain  This is a chronic problem. The current episode started more than 1 year ago. The problem occurs constantly. The problem is unchanged (HAS BEEN GETTING INJECTIONS IN BACK THAT HELP). Pertinent negatives include no abdominal pain, chest pain, dysuria, fever, headaches or weakness. Hypertension  This is a chronic problem. The current episode started more than 1 year ago. The problem has been gradually improving since onset. The problem is controlled. Pertinent negatives include no anxiety, blurred vision, chest pain, headaches, palpitations, peripheral edema, PND, shortness of breath or sweats. Review of Systems   Constitutional:  Negative for chills and fever. HENT:  Negative for ear pain, hearing loss and sore throat. Eyes:  Negative for blurred vision, photophobia and pain. Respiratory:  Negative for cough and shortness of breath.     Cardiovascular:  Negative for chest pain, palpitations, leg swelling and PND. Gastrointestinal:  Negative for abdominal distention, abdominal pain, blood in stool and nausea. Genitourinary:  Negative for dysuria and urgency. Musculoskeletal:  Positive for back pain. Negative for joint swelling and myalgias. Skin:  Negative for color change, pallor and rash. Neurological:  Negative for speech difficulty, weakness, light-headedness and headaches. Hematological:  Negative for adenopathy. Psychiatric/Behavioral:  Negative for agitation, confusion, hallucinations, self-injury and suicidal ideas. Objective   Physical Exam  Constitutional:       Appearance: Normal appearance. HENT:      Head: Normocephalic and atraumatic. Nose: Nose normal.   Eyes:      Extraocular Movements: Extraocular movements intact. Conjunctiva/sclera: Conjunctivae normal.      Pupils: Pupils are equal, round, and reactive to light. Cardiovascular:      Rate and Rhythm: Normal rate and regular rhythm. Pulses: Normal pulses. Heart sounds: Normal heart sounds. Pulmonary:      Effort: Pulmonary effort is normal.      Breath sounds: Normal breath sounds. Abdominal:      General: Abdomen is flat. Bowel sounds are normal.      Palpations: Abdomen is soft. Skin:     General: Skin is warm and dry. Neurological:      General: No focal deficit present. Mental Status: He is alert and oriented to person, place, and time. Psychiatric:         Mood and Affect: Mood normal.                 An electronic signature was used to authenticate this note.     --Suyapa Sanchez MD

## 2023-03-16 DIAGNOSIS — M79.604 RIGHT LEG PAIN: ICD-10-CM

## 2023-03-16 DIAGNOSIS — R22.41 LOCALIZED SWELLING OF RIGHT LOWER LEG: ICD-10-CM

## 2023-03-16 DIAGNOSIS — I83.813 VARICOSE VEINS OF BOTH LOWER EXTREMITIES WITH PAIN: ICD-10-CM

## 2023-03-23 ENCOUNTER — HOSPITAL ENCOUNTER (OUTPATIENT)
Dept: CT IMAGING | Age: 58
Discharge: HOME | End: 2023-03-23
Payer: MEDICARE

## 2023-03-23 DIAGNOSIS — I72.4 ANEURYSM OF ARTERY OF LOWER EXTREMITY (HCC): ICD-10-CM

## 2023-03-23 LAB — CREAT BLD-MCNC: 0.92 MG/DL (ref 0.8–1.5)

## 2023-03-23 PROCEDURE — 6360000004 HC RX CONTRAST MEDICATION: Performed by: SURGERY

## 2023-03-23 PROCEDURE — 82565 ASSAY OF CREATININE: CPT

## 2023-03-23 PROCEDURE — 2580000003 HC RX 258: Performed by: SURGERY

## 2023-03-23 PROCEDURE — 75635 CT ANGIO ABDOMINAL ARTERIES: CPT

## 2023-03-23 RX ORDER — 0.9 % SODIUM CHLORIDE 0.9 %
100 INTRAVENOUS SOLUTION INTRAVENOUS ONCE
Status: COMPLETED | OUTPATIENT
Start: 2023-03-23 | End: 2023-03-23

## 2023-03-23 RX ORDER — SODIUM CHLORIDE 0.9 % (FLUSH) 0.9 %
10 SYRINGE (ML) INJECTION
Status: COMPLETED | OUTPATIENT
Start: 2023-03-23 | End: 2023-03-23

## 2023-03-23 RX ADMIN — IOPAMIDOL 100 ML: 755 INJECTION, SOLUTION INTRAVENOUS at 14:12

## 2023-03-23 RX ADMIN — SODIUM CHLORIDE, PRESERVATIVE FREE 10 ML: 5 INJECTION INTRAVENOUS at 14:12

## 2023-03-23 RX ADMIN — SODIUM CHLORIDE 100 ML: 9 INJECTION, SOLUTION INTRAVENOUS at 14:12

## 2023-03-24 ENCOUNTER — OFFICE VISIT (OUTPATIENT)
Dept: VASCULAR SURGERY | Age: 58
End: 2023-03-24

## 2023-03-24 VITALS
BODY MASS INDEX: 29.95 KG/M2 | OXYGEN SATURATION: 98 % | WEIGHT: 226 LBS | HEIGHT: 73 IN | DIASTOLIC BLOOD PRESSURE: 85 MMHG | SYSTOLIC BLOOD PRESSURE: 126 MMHG | HEART RATE: 50 BPM

## 2023-03-24 DIAGNOSIS — I73.9 PVD (PERIPHERAL VASCULAR DISEASE) WITH CLAUDICATION (HCC): Primary | ICD-10-CM

## 2023-03-24 NOTE — PROGRESS NOTES
breath. Cardiovascular: Negative for chest pain, palpitations, claudication and leg swelling. Gastrointestinal: Negative for nausea, vomiting and abdominal pain. Genitourinary: Negative for dysuria, hematuria and flank pain. Musculoskeletal: Negative for joint pain and falls. Neurological: Negative for dizziness, sensory change, focal weakness, loss of consciousness and headaches. PHYSICAL EXAM   [unfilled]     Constitutional: he appears well-developed. No distress. HENT: Hearing intact. Head: Atraumatic. Eyes: Pupils are equal, round, and reactive to light. Neck: Normal range of motion. Cardiovascular: Regular rhythm. Pulmonary/Chest: Effort normal and breath sounds normal. No respiratory distress. Abdominal: Soft. Bowel sounds are normal. he exhibits no distension. There is no tenderness. There is no guarding. No hernia. Musculoskeletal: Normal range of motion. Neurological: He is alert. CN II- XII grossly intact  Skin: Warm and dry. Vascular: Palpable femoral pulses      Imaging Results  Yesterday no evidence of any atherosclerotic disease CTA  Heart duplex study showed ABIs of 1 bilaterally with triphasic flow down to the tibial vessels with toe pressure greater than 90 mmHg  ASSESSMENT AND PLAN   Mr. Apoorva Petersen. is a 56y.o. year old male with symptomatic venous disease with no evidence of any atherosclerotic disease. Patient follow-up as needed. Elements of this note have been dictated using speech recognition software. As a result, errors of speech recognition may have occurred. 50 minutes of time was spent on this consult with greater than 50% of time spent face-to-face with the patient discussing the disease process, education and treatment options.

## 2023-03-30 NOTE — PROGRESS NOTES
Reviewed CTA images again. 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 We will have patient follow-up in office in about 6 months with a repeat lower extremity duplex study.     Kelly Carroll

## 2023-05-29 DIAGNOSIS — G62.89 OTHER POLYNEUROPATHY: ICD-10-CM

## 2023-05-29 RX ORDER — GABAPENTIN 800 MG/1
800 TABLET ORAL 4 TIMES DAILY
Qty: 360 TABLET | Refills: 3 | Status: SHIPPED | OUTPATIENT
Start: 2023-05-29 | End: 2024-05-28

## 2023-06-02 ENCOUNTER — OFFICE VISIT (OUTPATIENT)
Dept: FAMILY MEDICINE CLINIC | Facility: CLINIC | Age: 58
End: 2023-06-02

## 2023-06-02 VITALS
TEMPERATURE: 98.3 F | WEIGHT: 229 LBS | OXYGEN SATURATION: 99 % | HEART RATE: 61 BPM | DIASTOLIC BLOOD PRESSURE: 98 MMHG | SYSTOLIC BLOOD PRESSURE: 130 MMHG | BODY MASS INDEX: 30.21 KG/M2

## 2023-06-02 DIAGNOSIS — R73.09 ABNORMAL GLUCOSE: Primary | ICD-10-CM

## 2023-06-02 DIAGNOSIS — Z00.00 MEDICARE ANNUAL WELLNESS VISIT, SUBSEQUENT: ICD-10-CM

## 2023-06-02 DIAGNOSIS — I72.4 POPLITEAL ARTERY ANEURYSM (HCC): ICD-10-CM

## 2023-06-02 DIAGNOSIS — E78.2 MIXED HYPERLIPIDEMIA: ICD-10-CM

## 2023-06-02 DIAGNOSIS — Z78.9 STATIN INTOLERANCE: ICD-10-CM

## 2023-06-02 DIAGNOSIS — K76.0 FATTY LIVER: ICD-10-CM

## 2023-06-02 DIAGNOSIS — I10 PRIMARY HYPERTENSION: ICD-10-CM

## 2023-06-02 LAB
ALBUMIN SERPL-MCNC: 4.3 G/DL (ref 3.5–5)
ALBUMIN/GLOB SERPL: 1.4 (ref 0.4–1.6)
ALP SERPL-CCNC: 67 U/L (ref 50–136)
ALT SERPL-CCNC: 35 U/L (ref 12–65)
ANION GAP SERPL CALC-SCNC: 4 MMOL/L (ref 2–11)
AST SERPL-CCNC: 27 U/L (ref 15–37)
BASOPHILS # BLD: 0 K/UL (ref 0–0.2)
BASOPHILS NFR BLD: 1 % (ref 0–2)
BILIRUB SERPL-MCNC: 0.5 MG/DL (ref 0.2–1.1)
BUN SERPL-MCNC: 15 MG/DL (ref 6–23)
CALCIUM SERPL-MCNC: 10.2 MG/DL (ref 8.3–10.4)
CHLORIDE SERPL-SCNC: 105 MMOL/L (ref 101–110)
CHOLEST SERPL-MCNC: 272 MG/DL
CO2 SERPL-SCNC: 28 MMOL/L (ref 21–32)
CREAT SERPL-MCNC: 1 MG/DL (ref 0.8–1.5)
DIFFERENTIAL METHOD BLD: ABNORMAL
EOSINOPHIL # BLD: 0.2 K/UL (ref 0–0.8)
EOSINOPHIL NFR BLD: 3 % (ref 0.5–7.8)
ERYTHROCYTE [DISTWIDTH] IN BLOOD BY AUTOMATED COUNT: 12 % (ref 11.9–14.6)
EST. AVERAGE GLUCOSE BLD GHB EST-MCNC: 103 MG/DL
GLOBULIN SER CALC-MCNC: 3 G/DL (ref 2.8–4.5)
GLUCOSE SERPL-MCNC: 91 MG/DL (ref 65–100)
HBA1C MFR BLD: 5.2 % (ref 4.8–5.6)
HCT VFR BLD AUTO: 51.3 % (ref 41.1–50.3)
HDLC SERPL-MCNC: 61 MG/DL (ref 40–60)
HDLC SERPL: 4.5
HGB BLD-MCNC: 17.1 G/DL (ref 13.6–17.2)
IMM GRANULOCYTES # BLD AUTO: 0 K/UL (ref 0–0.5)
IMM GRANULOCYTES NFR BLD AUTO: 0 % (ref 0–5)
LDLC SERPL CALC-MCNC: 172.2 MG/DL
LYMPHOCYTES # BLD: 1.4 K/UL (ref 0.5–4.6)
LYMPHOCYTES NFR BLD: 30 % (ref 13–44)
MCH RBC QN AUTO: 31.5 PG (ref 26.1–32.9)
MCHC RBC AUTO-ENTMCNC: 33.3 G/DL (ref 31.4–35)
MCV RBC AUTO: 94.6 FL (ref 82–102)
MONOCYTES # BLD: 0.6 K/UL (ref 0.1–1.3)
MONOCYTES NFR BLD: 13 % (ref 4–12)
NEUTS SEG # BLD: 2.5 K/UL (ref 1.7–8.2)
NEUTS SEG NFR BLD: 53 % (ref 43–78)
NRBC # BLD: 0 K/UL (ref 0–0.2)
PLATELET # BLD AUTO: 201 K/UL (ref 150–450)
PMV BLD AUTO: 10.1 FL (ref 9.4–12.3)
POTASSIUM SERPL-SCNC: 4.2 MMOL/L (ref 3.5–5.1)
PROT SERPL-MCNC: 7.3 G/DL (ref 6.3–8.2)
RBC # BLD AUTO: 5.42 M/UL (ref 4.23–5.6)
SODIUM SERPL-SCNC: 137 MMOL/L (ref 133–143)
TRIGL SERPL-MCNC: 194 MG/DL (ref 35–150)
VLDLC SERPL CALC-MCNC: 38.8 MG/DL (ref 6–23)
WBC # BLD AUTO: 4.7 K/UL (ref 4.3–11.1)

## 2023-06-02 RX ORDER — LOSARTAN POTASSIUM 100 MG/1
100 TABLET ORAL DAILY
Qty: 90 TABLET | Refills: 3 | Status: SHIPPED | OUTPATIENT
Start: 2023-06-02

## 2023-06-02 ASSESSMENT — PATIENT HEALTH QUESTIONNAIRE - PHQ9
SUM OF ALL RESPONSES TO PHQ QUESTIONS 1-9: 0
SUM OF ALL RESPONSES TO PHQ9 QUESTIONS 1 & 2: 0
2. FEELING DOWN, DEPRESSED OR HOPELESS: 0
SUM OF ALL RESPONSES TO PHQ QUESTIONS 1-9: 0
1. LITTLE INTEREST OR PLEASURE IN DOING THINGS: 0
SUM OF ALL RESPONSES TO PHQ QUESTIONS 1-9: 0
SUM OF ALL RESPONSES TO PHQ QUESTIONS 1-9: 0

## 2023-06-02 ASSESSMENT — LIFESTYLE VARIABLES
HOW OFTEN DO YOU HAVE A DRINK CONTAINING ALCOHOL: NEVER
HOW MANY STANDARD DRINKS CONTAINING ALCOHOL DO YOU HAVE ON A TYPICAL DAY: PATIENT DOES NOT DRINK

## 2023-06-02 NOTE — PROGRESS NOTES
Medicare Annual Wellness Visit    Fiorella Gonzalez is here for 3 Month Follow-Up, Medicare AWV, and Medication Refill    Assessment & Plan   Abnormal glucose  -     Hemoglobin A1C; Future  Primary hypertension--REPEAT BP HIGH---continue cozaar; check bp at home and if still elevated let me know readings and consider hctz. -     losartan (COZAAR) 100 MG tablet; Take 1 tablet by mouth daily, Disp-90 tablet, R-3Normal  -     CBC with Auto Differential; Future  -     Comprehensive Metabolic Panel; Future  Mixed hyperlipidemia  -     Lipid Panel; Future  Popliteal artery aneurysm (HCC)--notes from vascular are reviewed---this will be monitored closely by vascular---abis are normal    Fatty liver--low fat diet  Statin intolerance--consider repatha if chol high. Recommendations for Preventive Services Due: see orders and patient instructions/AVS.  Recommended screening schedule for the next 5-10 years is provided to the patient in written form: see Patient Instructions/AVS.     No follow-ups on file. Subjective   The following acute and/or chronic problems were also addressed today:  HYPERTENSION; HYPERLIPIDEMIA    Patient's complete Health Risk Assessment and screening values have been reviewed and are found in Flowsheets. The following problems were reviewed today and where indicated follow up appointments were made and/or referrals ordered.     Positive Risk Factor Screenings with Interventions:               General HRA Questions:  Select all that apply: (!) New or Increased Pain    Pain Interventions:  Patient declined any further interventions or treatment       Weight and Activity:  Physical Activity: Insufficiently Active    Days of Exercise per Week: 3 days    Minutes of Exercise per Session: 20 min     On average, how many days per week do you engage in moderate to strenuous exercise (like a brisk walk)?: 3 days  Have you lost any weight without trying in the past 3 months?: No  Body mass index is 30.21

## 2023-06-02 NOTE — PATIENT INSTRUCTIONS
Learning About Vision Tests  What are vision tests? The four most common vision tests are visual acuity tests, refraction, visual field tests, and color vision tests. Visual acuity (sharpness) tests  These tests are used: To see if you need glasses or contact lenses. To monitor an eye problem. To check an eye injury. Visual acuity tests are done as part of routine exams. You may also have this test when you get your 's license or apply for some types of jobs. Visual field tests  These tests are used: To check for vision loss in any area of your range of vision. To screen for certain eye diseases. To look for nerve damage after a stroke, head injury, or other problem that could reduce blood flow to the brain. Refraction and color tests  A refraction test is done to find the right prescription for glasses and contact lenses. A color vision test is done to check for color blindness. Color vision is often tested as part of a routine exam. You may also have this test when you apply for a job where recognizing different colors is important, such as , electronics, or the [employer]. How are vision tests done? Visual acuity test   You cover one eye at a time. You read aloud from a wall chart across the room. You read aloud from a small card that you hold in your hand. Refraction   You look into a special device. The device puts lenses of different strengths in front of each eye to see how strong your glasses or contact lenses need to be. Visual field tests   Your doctor may have you look through special machines. Or your doctor may simply have you stare straight ahead while they move a finger into and out of your field of vision. Color vision test   You look at pieces of printed test patterns in various colors. You say what number or symbol you see. Your doctor may have you trace the number or symbol using a pointer. How do these tests feel?   There is very little chance of

## 2023-06-06 ENCOUNTER — TELEPHONE (OUTPATIENT)
Dept: ORTHOPEDIC SURGERY | Age: 58
End: 2023-06-06

## 2023-06-06 DIAGNOSIS — Z82.49 FAMILY HISTORY OF CHRONIC ISCHEMIC HEART DISEASE: ICD-10-CM

## 2023-06-06 DIAGNOSIS — M47.817 SPONDYLOSIS WITHOUT MYELOPATHY OR RADICULOPATHY, LUMBOSACRAL REGION: Primary | ICD-10-CM

## 2023-06-06 DIAGNOSIS — M51.36 DDD (DEGENERATIVE DISC DISEASE), LUMBAR: ICD-10-CM

## 2023-06-06 DIAGNOSIS — M54.17 RADICULOPATHY, LUMBOSACRAL REGION: ICD-10-CM

## 2023-06-06 DIAGNOSIS — E78.00 ELEVATED LOW DENSITY LIPOPROTEIN (LDL) CHOLESTEROL LEVEL: ICD-10-CM

## 2023-06-06 DIAGNOSIS — E78.2 MIXED HYPERLIPIDEMIA: Primary | ICD-10-CM

## 2023-06-06 DIAGNOSIS — M48.062 SPINAL STENOSIS OF LUMBAR REGION WITH NEUROGENIC CLAUDICATION: ICD-10-CM

## 2023-06-23 ENCOUNTER — TELEPHONE (OUTPATIENT)
Dept: FAMILY MEDICINE CLINIC | Facility: CLINIC | Age: 58
End: 2023-06-23

## 2023-06-23 NOTE — TELEPHONE ENCOUNTER
Did PA through covermymeds regarding Repatha. Used upad and they stated patient was inactive. In our system it shows that insurance was effective 1/2023. I have reached out to patient through [employer] to confirm and see if he has a prescription card separate.

## 2023-06-26 ENCOUNTER — TELEPHONE (OUTPATIENT)
Dept: FAMILY MEDICINE CLINIC | Facility: CLINIC | Age: 58
End: 2023-06-26

## 2023-06-29 ENCOUNTER — OFFICE VISIT (OUTPATIENT)
Dept: ORTHOPEDIC SURGERY | Age: 58
End: 2023-06-29

## 2023-06-29 VITALS — BODY MASS INDEX: 29.03 KG/M2 | WEIGHT: 220 LBS

## 2023-06-29 DIAGNOSIS — M75.42 IMPINGEMENT SYNDROME OF LEFT SHOULDER: ICD-10-CM

## 2023-06-29 DIAGNOSIS — M25.512 BILATERAL SHOULDER PAIN, UNSPECIFIED CHRONICITY: Primary | ICD-10-CM

## 2023-06-29 DIAGNOSIS — M75.41 IMPINGEMENT SYNDROME OF RIGHT SHOULDER: ICD-10-CM

## 2023-06-29 DIAGNOSIS — M25.511 BILATERAL SHOULDER PAIN, UNSPECIFIED CHRONICITY: Primary | ICD-10-CM

## 2023-06-29 RX ORDER — DICLOFENAC SODIUM 75 MG/1
75 TABLET, DELAYED RELEASE ORAL 2 TIMES DAILY
Qty: 60 TABLET | Refills: 0 | Status: SHIPPED | OUTPATIENT
Start: 2023-06-29

## 2023-08-10 ENCOUNTER — OFFICE VISIT (OUTPATIENT)
Dept: ORTHOPEDIC SURGERY | Age: 58
End: 2023-08-10

## 2023-08-10 DIAGNOSIS — M25.511 BILATERAL SHOULDER PAIN, UNSPECIFIED CHRONICITY: ICD-10-CM

## 2023-08-10 DIAGNOSIS — M25.562 PAIN IN BOTH KNEES, UNSPECIFIED CHRONICITY: Primary | ICD-10-CM

## 2023-08-10 DIAGNOSIS — M25.512 BILATERAL SHOULDER PAIN, UNSPECIFIED CHRONICITY: ICD-10-CM

## 2023-08-10 DIAGNOSIS — M25.561 PAIN IN BOTH KNEES, UNSPECIFIED CHRONICITY: Primary | ICD-10-CM

## 2023-08-10 RX ORDER — TRIAMCINOLONE ACETONIDE 40 MG/ML
40 INJECTION, SUSPENSION INTRA-ARTICULAR; INTRAMUSCULAR ONCE
Status: COMPLETED | OUTPATIENT
Start: 2023-08-10 | End: 2023-08-10

## 2023-08-10 RX ADMIN — TRIAMCINOLONE ACETONIDE 40 MG: 40 INJECTION, SUSPENSION INTRA-ARTICULAR; INTRAMUSCULAR at 11:57

## 2023-08-10 NOTE — PROGRESS NOTES
Name: Harry Galindo YOB: 1965  Gender: male  MRN: [id number]      CC: Shoulder Pain (BL)       HPI: Haryr Galindo is a 66 y.o. male who returns for follow up on bilateral shoulders his pain is nearly resolved he had excellent response to the injection and home exercise program.  He has a new complaint today of bilateral knee pain and has had a history of knee arthritis and injections that gave him good response in the past.        Physical Examination:  General: no acute distress  Lungs: breathing easily  CV: regular rhythm by pulse  Right Shoulder and Left Shoulder: Bilateral shoulder exams are benign minimal discomfort with impingement testing 5 out of 5 rotator cuff strength bilaterally. Bilateral knee exam is similar diffuse tenderness palpation medial lateral joint line pain at the extremes of motion mild discomfort with provocative meniscal testing. Assessment:     ICD-10-CM    1. Pain in both knees, unspecified chronicity  M25.561 DRAIN/INJECT LARGE JOINT/BURSA    M25.562 triamcinolone acetonide (KENALOG-40) injection 40 mg     triamcinolone acetonide (KENALOG-40) injection 40 mg      2. Bilateral shoulder pain, unspecified chronicity  M25.511     M25.512           Plan:   Excellent response to conservative management of bilateral shoulder she will continue home exercise program and follow-up as needed. As far as his knees go we discussed conservative management anti-inflammatories quadrant and activity modification as well as potential intra-articular injections which she elected to proceed with today he has had a good response to this in the past.  The patient elected to proceed with an intraarticular knee injection today. After verbal informed consent was obtained after sterile prep the Bilateral knee  was injected from a anterior lateral approach with 4 cc of 0.25% Marcaine and 1 cc of 40 mg Kenalog.   The patient tolerated the procedure well was given postinjection

## 2023-08-26 DIAGNOSIS — I10 PRIMARY HYPERTENSION: Primary | ICD-10-CM

## 2023-08-26 RX ORDER — HYDROCHLOROTHIAZIDE 12.5 MG/1
12.5 CAPSULE, GELATIN COATED ORAL EVERY MORNING
Qty: 90 CAPSULE | Refills: 3 | Status: SHIPPED | OUTPATIENT
Start: 2023-08-26

## 2023-10-03 ENCOUNTER — OFFICE VISIT (OUTPATIENT)
Dept: VASCULAR SURGERY | Age: 58
End: 2023-10-03
Payer: MEDICARE

## 2023-10-03 VITALS
DIASTOLIC BLOOD PRESSURE: 87 MMHG | OXYGEN SATURATION: 100 % | SYSTOLIC BLOOD PRESSURE: 146 MMHG | HEIGHT: 73 IN | HEART RATE: 54 BPM | BODY MASS INDEX: 29.69 KG/M2 | WEIGHT: 224 LBS

## 2023-10-03 DIAGNOSIS — I73.9 PVD (PERIPHERAL VASCULAR DISEASE) WITH CLAUDICATION (HCC): Primary | ICD-10-CM

## 2023-10-03 PROCEDURE — G8484 FLU IMMUNIZE NO ADMIN: HCPCS | Performed by: SURGERY

## 2023-10-03 PROCEDURE — 99213 OFFICE O/P EST LOW 20 MIN: CPT | Performed by: SURGERY

## 2023-10-03 PROCEDURE — G8427 DOCREV CUR MEDS BY ELIG CLIN: HCPCS | Performed by: SURGERY

## 2023-10-03 PROCEDURE — 1036F TOBACCO NON-USER: CPT | Performed by: SURGERY

## 2023-10-03 PROCEDURE — 3017F COLORECTAL CA SCREEN DOC REV: CPT | Performed by: SURGERY

## 2023-10-03 PROCEDURE — G8417 CALC BMI ABV UP PARAM F/U: HCPCS | Performed by: SURGERY

## 2023-10-03 NOTE — PROGRESS NOTES
[address]   [address] [facility]. [address] [place] [address]  FAX: [phone] [facility] Mague Poole. : 1965    Chief Complaint:     History of Present Illness:   Patient follows up today for follow-up duplex study. Patient found on 1.5 cm aneurysm on CT scan. Patient denies abdominal pain or back pain    CURRENT MEDICATIONS:  Current Outpatient Medications   Medication Sig Dispense Refill    hydroCHLOROthiazide (MICROZIDE) 12.5 MG capsule Take 1 capsule by mouth every morning 90 capsule 3    diclofenac (VOLTAREN) 75 MG EC tablet Take 1 tablet by mouth 2 times daily (Patient taking differently: Take 1 tablet by mouth 2 times daily as needed) 60 tablet 0    losartan (COZAAR) 100 MG tablet Take 1 tablet by mouth daily 90 tablet 3    gabapentin (NEURONTIN) 800 MG tablet Take 1 tablet by mouth 4 times daily. 360 tablet 3    promethazine (PHENERGAN) 25 MG tablet Take 1 tablet by mouth daily as needed for Nausea 30 tablet 3    valACYclovir (VALTREX) 500 MG tablet Take 1 tablet by mouth 2 times daily 180 tablet 3     No current facility-administered medications for this visit. Past Medical History:   Diagnosis Date    Backache, unspecified 2015    Chronic back pain     Neuropathy     Pain in joint, multiple sites 2015    Positive sm/RNP antibody 2015       Physical Examination:   Height: 6' 1\" (1.854 m), Weight - Scale: 224 lb (101.6 kg), BP: (!) 146/87    Constitutional: he appears well-developed. No distress. HENT:   Head: Atraumatic. Eyes: Pupils are equal, round, and reactive to light. Neck: Normal range of motion. Cardiovascular: Regular rhythm. Pulmonary/Chest: Effort normal and breath sounds normal. No respiratory distress. Abdominal: Soft. Bowel sounds are normal. he exhibits no distension. There is no tenderness. There is no guarding. No hernia. Musculoskeletal: Normal range of motion. Neurological: He is alert.  CN II- XII grossly intact   Vascular:

## 2023-10-09 ENCOUNTER — TELEPHONE (OUTPATIENT)
Dept: ORTHOPEDIC SURGERY | Age: 58
End: 2023-10-09

## 2023-10-09 DIAGNOSIS — M54.17 RADICULOPATHY, LUMBOSACRAL REGION: Primary | ICD-10-CM

## 2023-10-09 DIAGNOSIS — M48.062 SPINAL STENOSIS OF LUMBAR REGION WITH NEUROGENIC CLAUDICATION: ICD-10-CM

## 2023-10-09 DIAGNOSIS — M51.36 DDD (DEGENERATIVE DISC DISEASE), LUMBAR: ICD-10-CM

## 2023-10-09 NOTE — TELEPHONE ENCOUNTER
Appointment Request From: Michael Colon.      With Provider: Veronica Edwards MD [[address]     Preferred Date Range: 10/16/2023 - 10/20/2023     Preferred Times: Any Time     Reason for visit: Request an Appointment     Comments:  Back injection

## 2023-10-25 ENCOUNTER — OFFICE VISIT (OUTPATIENT)
Dept: ORTHOPEDIC SURGERY | Age: 58
End: 2023-10-25
Payer: MEDICARE

## 2023-10-25 DIAGNOSIS — M54.17 RADICULOPATHY, LUMBOSACRAL REGION: Primary | ICD-10-CM

## 2023-10-25 DIAGNOSIS — M48.062 SPINAL STENOSIS OF LUMBAR REGION WITH NEUROGENIC CLAUDICATION: ICD-10-CM

## 2023-10-25 DIAGNOSIS — M51.36 DDD (DEGENERATIVE DISC DISEASE), LUMBAR: ICD-10-CM

## 2023-10-25 PROCEDURE — 64483 NJX AA&/STRD TFRM EPI L/S 1: CPT | Performed by: PHYSICAL MEDICINE & REHABILITATION

## 2023-10-25 RX ORDER — TRIAMCINOLONE ACETONIDE 40 MG/ML
40 INJECTION, SUSPENSION INTRA-ARTICULAR; INTRAMUSCULAR ONCE
Status: COMPLETED | OUTPATIENT
Start: 2023-10-25 | End: 2023-10-25

## 2023-10-25 RX ADMIN — TRIAMCINOLONE ACETONIDE 40 MG: 40 INJECTION, SUSPENSION INTRA-ARTICULAR; INTRAMUSCULAR at 15:31

## 2023-10-25 NOTE — PROGRESS NOTES
Name: Cristhian Ramirez. YOB: 1965  Gender: male  MRN: [id number]        Transforaminal CARMEL Procedure Note    Procedure: Bilateral  L4-L5 transforaminal epidural steroid injections     Precautions: Cristhian Ramirez. denies prior sensitivity to steroid, local anesthetic, iodine, or shellfish. Consent:  Consent was obtained prior to the procedure. The procedure was discussed at length with Cristhian Ramirez. Velma Ratliff He was given the opportunity to ask questions regarding the procedure and its associated risks. In addition to the potential risks associated with the procedure itself, the patient was informed both verbally and in writing of potential side effects of the use glucocorticoids. The patient appeared to comprehend the informed consent and desired to have the procedure performed, and informed consent was signed. He was placed in a prone position on the fluoroscopy table and the skin was prepped and draped in a routine sterile fashion. The areas to be injected were each anesthetized with 1 ml of 1% Lidocaine. A 22 gauge 3.5 inch spinal needle was carefully advanced under fluoroscopic guidance to the right L4-L5 transforaminal space  0.5 ml of 70% of Omnipaque was injected to confirm proper needle placement and absence of subdural or vascular flow Once proper placement was confirmed, 0.5ml of 0.25 marcaine and 40 mg kenalog were injected through the spinal needle. The above procedure was then repeated at the Left  L4-L5 transforaminal space. Fluoroscopic guidance was used intermittently over a 10-minute period to insure proper needle placement and his safety. A hard copy of the fluoroscopic image has been placed in his chart and is saved on the C-arm hard drive. He was monitored for 30 minutes after the procedure and discharged home in a stable fashion with a routine follow up.     Procedural Diagnosis:     ICD-10-CM    1. Radiculopathy, lumbosacral region  M54.17 FL NERVE
Negative Screen

## 2024-01-31 ENCOUNTER — TELEPHONE (OUTPATIENT)
Dept: ORTHOPEDIC SURGERY | Age: 59
End: 2024-01-31

## 2024-01-31 DIAGNOSIS — M51.36 DDD (DEGENERATIVE DISC DISEASE), LUMBAR: ICD-10-CM

## 2024-01-31 DIAGNOSIS — M48.062 SPINAL STENOSIS OF LUMBAR REGION WITH NEUROGENIC CLAUDICATION: ICD-10-CM

## 2024-01-31 DIAGNOSIS — M54.17 RADICULOPATHY, LUMBOSACRAL REGION: Primary | ICD-10-CM

## 2024-01-31 NOTE — TELEPHONE ENCOUNTER
Appointment Request  (Newest Message First)  Phi River Jr. \"Cortez\"  [facility]  StaffYesterday (9:33 AM)       Appointment Request From: Phi River Jr.     With Provider: DELORIS SCHOFIELD MD [[facility]]     Preferred Date Range: Any     Preferred Times: Any Time     Reason for visit: Request an Appointment     Comments:  Back injection

## 2024-02-07 ENCOUNTER — OFFICE VISIT (OUTPATIENT)
Dept: ORTHOPEDIC SURGERY | Age: 59
End: 2024-02-07
Payer: MEDICARE

## 2024-02-07 DIAGNOSIS — M51.36 DDD (DEGENERATIVE DISC DISEASE), LUMBAR: ICD-10-CM

## 2024-02-07 DIAGNOSIS — M54.17 RADICULOPATHY, LUMBOSACRAL REGION: Primary | ICD-10-CM

## 2024-02-07 DIAGNOSIS — M48.062 SPINAL STENOSIS OF LUMBAR REGION WITH NEUROGENIC CLAUDICATION: ICD-10-CM

## 2024-02-07 PROCEDURE — 64483 NJX AA&/STRD TFRM EPI L/S 1: CPT | Performed by: PHYSICAL MEDICINE & REHABILITATION

## 2024-02-07 RX ORDER — TRIAMCINOLONE ACETONIDE 40 MG/ML
40 INJECTION, SUSPENSION INTRA-ARTICULAR; INTRAMUSCULAR ONCE
Status: COMPLETED | OUTPATIENT
Start: 2024-02-07 | End: 2024-02-07

## 2024-02-07 RX ADMIN — TRIAMCINOLONE ACETONIDE 40 MG: 40 INJECTION, SUSPENSION INTRA-ARTICULAR; INTRAMUSCULAR at 14:28

## 2024-02-07 NOTE — PROGRESS NOTES
Name: Phi River Jr.  YOB: 1965  Gender: male  MRN: [id number]        Transforaminal CARMEL Procedure Note    Procedure: Bilateral  L4-L5 transforaminal epidural steroid injections     Precautions: Phi River Jr. denies prior sensitivity to steroid, local anesthetic, iodine, or shellfish.       Consent:  Consent was obtained prior to the procedure. The procedure was discussed at length with Phi River Jr.. He was given the opportunity to ask questions regarding the procedure and its associated risks.  In addition to the potential risks associated with the procedure itself, the patient was informed both verbally and in writing of potential side effects of the use glucocorticoids.  The patient appeared to comprehend the informed consent and desired to have the procedure performed, and informed consent was signed.     He was placed in a prone position on the fluoroscopy table and the skin was prepped and draped in a routine sterile fashion. The areas to be injected were each anesthetized with 1 ml of 1% Lidocaine. A 22 gauge 3.5 inch spinal needle was carefully advanced under fluoroscopic guidance to the right L4-L5 transforaminal space  0.5 ml of 70% of Omnipaque was injected to confirm proper needle placement and absence of subdural or vascular flow Once proper placement was confirmed, 0.5ml of 0.25 marcaine and 40 mg kenalog were injected through the spinal needle.       The above procedure was then repeated at the Left  L4-L5 transforaminal space.    Fluoroscopic guidance was used intermittently over a 10-minute period to insure proper needle placement and his safety. A hard copy of the fluoroscopic image has been placed in his chart and is saved on the C-arm hard drive. He was monitored for 30 minutes after the procedure and discharged home in a stable fashion with a routine follow up.    Procedural Diagnosis:     ICD-10-CM    1. Radiculopathy, lumbosacral region  M54.17 FL NERVE

## 2024-02-18 DIAGNOSIS — I10 PRIMARY HYPERTENSION: Primary | ICD-10-CM

## 2024-02-18 RX ORDER — LOSARTAN POTASSIUM AND HYDROCHLOROTHIAZIDE 12.5; 1 MG/1; MG/1
1 TABLET ORAL DAILY
Qty: 90 TABLET | Refills: 2 | Status: SHIPPED | OUTPATIENT
Start: 2024-02-18

## 2024-03-19 ENCOUNTER — PATIENT MESSAGE (OUTPATIENT)
Dept: ORTHOPEDIC SURGERY | Age: 59
End: 2024-03-19

## 2024-03-19 NOTE — TELEPHONE ENCOUNTER
From: Phi River Jr.  To: Dr. Lul Cruz  Sent: 3/18/2024 7:35 AM EDT  Subject: Appointment Request    Appointment Request From: Phi River Jr.    With Provider: Lul Cruz MD [[facility]]    Preferred Date Range: 3/18/2024 – 3/25/2024    Preferred Times: Any Time    Reason for visit: Request an Appointment    Comments:  Injections for knees and shoulders.

## 2024-04-05 ENCOUNTER — OFFICE VISIT (OUTPATIENT)
Dept: VASCULAR SURGERY | Age: 59
End: 2024-04-05
Payer: MEDICARE

## 2024-04-05 VITALS
HEART RATE: 68 BPM | DIASTOLIC BLOOD PRESSURE: 90 MMHG | SYSTOLIC BLOOD PRESSURE: 149 MMHG | WEIGHT: 225 LBS | BODY MASS INDEX: 29.82 KG/M2 | OXYGEN SATURATION: 97 % | HEIGHT: 73 IN

## 2024-04-05 DIAGNOSIS — I73.9 PVD (PERIPHERAL VASCULAR DISEASE) WITH CLAUDICATION (HCC): Primary | ICD-10-CM

## 2024-04-05 PROCEDURE — G8427 DOCREV CUR MEDS BY ELIG CLIN: HCPCS | Performed by: SURGERY

## 2024-04-05 PROCEDURE — G8417 CALC BMI ABV UP PARAM F/U: HCPCS | Performed by: SURGERY

## 2024-04-05 PROCEDURE — 99213 OFFICE O/P EST LOW 20 MIN: CPT | Performed by: SURGERY

## 2024-04-05 PROCEDURE — 1036F TOBACCO NON-USER: CPT | Performed by: SURGERY

## 2024-04-05 PROCEDURE — 3017F COLORECTAL CA SCREEN DOC REV: CPT | Performed by: SURGERY

## 2024-04-05 NOTE — PROGRESS NOTES
[address] [address]  [phone] FAX: [phone]    Phi River JrMacho  : 1965    Chief Complaint:     History of Present Illness:   Patient follows up today for follow-up duplex study.  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  Patient denies any claudication or rest pain symptoms.    CURRENT MEDICATIONS:  Current Outpatient Medications   Medication Sig Dispense Refill    losartan-hydroCHLOROthiazide (HYZAAR) 100-12.5 MG per tablet Take 1 tablet by mouth daily 90 tablet 2    gabapentin (NEURONTIN) 800 MG tablet Take 1 tablet by mouth 4 times daily. 360 tablet 3    promethazine (PHENERGAN) 25 MG tablet Take 1 tablet by mouth daily as needed for Nausea 30 tablet 3    hydroCHLOROthiazide (MICROZIDE) 12.5 MG capsule Take 1 capsule by mouth every morning (Patient not taking: Reported on 2024) 90 capsule 3    losartan (COZAAR) 100 MG tablet Take 1 tablet by mouth daily (Patient not taking: Reported on 2024) 90 tablet 3    valACYclovir (VALTREX) 500 MG tablet Take 1 tablet by mouth 2 times daily (Patient not taking: Reported on 2024) 180 tablet 3     No current facility-administered medications for this visit.       Past Medical History:   Diagnosis Date    Backache, unspecified 2015    Chronic back pain     Neuropathy     Pain in joint, multiple sites 2015    Positive sm/RNP antibody 2015       Physical Examination:   Height: 1.854 m (6' 1\"), Weight - Scale: 102.1 kg (225 lb), BP: (!) 149/90    Constitutional: he appears well-developed. No distress.   HENT:   Head: Atraumatic.   Eyes: Pupils are equal, round, and reactive to light.   Neck: Normal range of motion.   Cardiovascular: Regular rhythm.    Pulmonary/Chest: Effort normal and breath sounds normal. No respiratory distress.   Abdominal: Soft. Bowel sounds are normal. he exhibits no distension. There is no tenderness. There is no guarding. No

## 2024-04-17 ENCOUNTER — TELEPHONE (OUTPATIENT)
Dept: ORTHOPEDIC SURGERY | Age: 59
End: 2024-04-17

## 2024-04-17 NOTE — TELEPHONE ENCOUNTER
Appointment Request  (Newest Message First)  Phi River Jr. \"Cortez\"  [facility]  StaffYesterday (7:08 AM)       Appointment Request From: Phi River Jr.     With Provider: DELORIS SCHOFIELD MD [[facility]]     Preferred Date Range: Any     Preferred Times: Any Time     Reason for visit: Request an Appointment     Comments:  Back injection  
marijuana

## 2024-04-24 ENCOUNTER — OFFICE VISIT (OUTPATIENT)
Dept: ORTHOPEDIC SURGERY | Age: 59
End: 2024-04-24
Payer: MEDICARE

## 2024-04-24 VITALS — HEIGHT: 73 IN | WEIGHT: 225 LBS | BODY MASS INDEX: 29.82 KG/M2

## 2024-04-24 DIAGNOSIS — M54.17 RADICULOPATHY, LUMBOSACRAL REGION: Primary | ICD-10-CM

## 2024-04-24 PROCEDURE — 1036F TOBACCO NON-USER: CPT | Performed by: PHYSICIAN ASSISTANT

## 2024-04-24 PROCEDURE — G8427 DOCREV CUR MEDS BY ELIG CLIN: HCPCS | Performed by: PHYSICIAN ASSISTANT

## 2024-04-24 PROCEDURE — 99214 OFFICE O/P EST MOD 30 MIN: CPT | Performed by: PHYSICIAN ASSISTANT

## 2024-04-24 PROCEDURE — G8417 CALC BMI ABV UP PARAM F/U: HCPCS | Performed by: PHYSICIAN ASSISTANT

## 2024-04-24 PROCEDURE — 3017F COLORECTAL CA SCREEN DOC REV: CPT | Performed by: PHYSICIAN ASSISTANT

## 2024-04-24 RX ORDER — LORAZEPAM 1 MG/1
TABLET ORAL
Qty: 1 TABLET | Refills: 0 | Status: SHIPPED | OUTPATIENT
Start: 2024-04-24 | End: 2024-05-24

## 2024-04-24 NOTE — PROGRESS NOTES
Name: Phi River Jr.  YOB: 1965  Gender: male  MRN: [id number]         *ANNUAL VISIT *        CC:   Chief Complaint   Patient presents with    New Patient     Low back pain          HPI:   Phi River Jr. is a 59 y.o. male with  PMHx below.  There are an established  patient of Dr. Zambrano, and present here for Annual Visit.        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  Recently underwent an injection here on 2/7/2024.  He only had meaningful relief for a few days before his symptoms began to return.  He continues to have rating pain down his legs in similar fashion.  He is having difficulty walking and getting around.  He is not interested in being evaluated by pain management for medical management as he did trial this at 1 point in the past.  He reports his last lumbar MRI was several years ago at this point.            Past Medical History Includes:   Past Medical History:   Diagnosis Date    Backache, unspecified 9/17/2015    Chronic back pain     Neuropathy     Pain in joint, multiple sites 9/17/2015    Positive sm/RNP antibody 9/17/2015   ,   Past Surgical History:   Procedure Laterality Date    NEUROLOGICAL SURGERY  2000    back surgery lumbar    SHOULDER ARTHROSCOPY Left 12/18/2020    WISDOM TOOTH EXTRACTION       Family History:   Family History   Problem Relation Age of Onset    Diabetes Father     Hypertension Father     Heart Disease Father       Social History:   Social History     Tobacco Use    Smoking status: Never    Smokeless tobacco: Never   Substance Use Topics    Alcohol use: Not Currently       ALLERGIES: No Known Allergies     Patient Medications    Current Outpatient Medications   Medication Sig Dispense Refill    losartan-hydroCHLOROthiazide (HYZAAR) 100-12.5 MG per tablet Take 1 tablet by mouth daily 90 tablet 2    hydroCHLOROthiazide (MICROZIDE) 12.5 MG capsule Take 1 capsule by mouth

## 2024-04-30 ENCOUNTER — OFFICE VISIT (OUTPATIENT)
Dept: ORTHOPEDIC SURGERY | Age: 59
End: 2024-04-30

## 2024-04-30 DIAGNOSIS — M25.561 PAIN IN BOTH KNEES, UNSPECIFIED CHRONICITY: Primary | ICD-10-CM

## 2024-04-30 DIAGNOSIS — M25.562 PAIN IN BOTH KNEES, UNSPECIFIED CHRONICITY: Primary | ICD-10-CM

## 2024-04-30 RX ORDER — DICLOFENAC SODIUM 75 MG/1
75 TABLET, DELAYED RELEASE ORAL 2 TIMES DAILY
Qty: 60 TABLET | Refills: 1 | Status: SHIPPED | OUTPATIENT
Start: 2024-04-30

## 2024-04-30 RX ORDER — TRIAMCINOLONE ACETONIDE 40 MG/ML
40 INJECTION, SUSPENSION INTRA-ARTICULAR; INTRAMUSCULAR ONCE
Status: COMPLETED | OUTPATIENT
Start: 2024-04-30 | End: 2024-04-30

## 2024-04-30 RX ADMIN — TRIAMCINOLONE ACETONIDE 40 MG: 40 INJECTION, SUSPENSION INTRA-ARTICULAR; INTRAMUSCULAR at 15:24

## 2024-04-30 NOTE — PROGRESS NOTES
Name: Phi River Jr.  YOB: 1965  Gender: male  MRN: [id number]      CC: Shoulder Pain (B) and Knee Pain (B)       HPI: Phi River Jr. is a 59 y.o. male who returns for follow up on bilateral shoulder and bilateral knees.  His symptoms has gradually increased since his last visit.  He has had injections to both areas in the past and is requesting repeat injections today.  He describes severe excruciating knee pain with sitting as well as walking mostly in the anterior aspect and around the patellofemoral joint.        Physical Examination:  General: no acute distress  Lungs: breathing easily  CV: regular rhythm by pulse  Right Knee and Left Knee: Exams are similar pain and crepitus with palpation with compression patellofemoral joint full passive and active range of motion with discomfort negative provocative meniscal testing or ligamentous testing.    Shoulder exams are similar bilaterally with pain with Babcock and Neer impingement signs mild pain with Pettis's and speeds appropriate resisted rotator cuff strength and empty can internal and external rotation position    Knee XR: 4 views     Clinical Indication  1. Pain in both knees, unspecified chronicity           Report: AP, lateral, PA flexion, sunrise views of the Bilateral knee demonstrates no acute fracture dislocation, mild degenerative changes    Impression: No acute findings as above              Assessment:     ICD-10-CM    1. Pain in both knees, unspecified chronicity  M25.561 XR Knee Bilateral Standard Extended VW    M25.562 triamcinolone acetonide (KENALOG-40) injection 40 mg     triamcinolone acetonide (KENALOG-40) injection 40 mg     DRAIN/INJECT LARGE JOINT/BURSA     DRAIN/INJECT LARGE JOINT/BURSA          Plan:   Chronic knee pain I think is patellofemoral arthritis patellofemoral syndrome he has chronic pain in multiple joints I recommend some anti-inflammatories.  We refilled his Voltaren today which he had good

## 2024-05-01 NOTE — PROGRESS NOTES
Name: Phi River Jr.  YOB: 1965  Gender: male  MRN: [id number]      CC: Shoulder Pain (B)     HPI: Phi River Jr. is a 59 y.o. male who returns for follow up for bilateral subacromial injections in his shoulders.        Physical Examination:  General: no acute distress  Lungs: breathing easily  CV: regular rhythm by pulse  Right Shoulder and Left Shoulder: Bilateral shoulder exams are unchanged positive Babcock and Neer impingement signs appropriate resisted strength rotator cuff testing.        Assessment:     ICD-10-CM    1. Impingement syndrome of left shoulder  M75.42 triamcinolone acetonide (KENALOG-40) injection 40 mg     DRAIN/INJECT LARGE JOINT/BURSA      2. Impingement syndrome of right shoulder  M75.41 triamcinolone acetonide (KENALOG-40) injection 40 mg     DRAIN/INJECT LARGE JOINT/BURSA          Plan:   The patient elected to proceed with a subacromial injection today.  After verbal informed consent was obtained after sterile prep the bilateral shoulder subacromial space was injected from a posterior lateral approach with 6 cc of 0.25% Marcaine and 1 cc of 40 mg Kenalog.  The patient tolerated the procedure well was given postinjection flare precautions.             Lul Cruz MD, FAAOS  Orthopaedics and Sports Medicine

## 2024-05-07 ENCOUNTER — OFFICE VISIT (OUTPATIENT)
Dept: ORTHOPEDIC SURGERY | Age: 59
End: 2024-05-07
Payer: MEDICARE

## 2024-05-07 DIAGNOSIS — M75.42 IMPINGEMENT SYNDROME OF LEFT SHOULDER: Primary | ICD-10-CM

## 2024-05-07 DIAGNOSIS — M75.41 IMPINGEMENT SYNDROME OF RIGHT SHOULDER: ICD-10-CM

## 2024-05-07 PROCEDURE — 20610 DRAIN/INJ JOINT/BURSA W/O US: CPT | Performed by: ORTHOPAEDIC SURGERY

## 2024-05-07 RX ORDER — TRIAMCINOLONE ACETONIDE 40 MG/ML
40 INJECTION, SUSPENSION INTRA-ARTICULAR; INTRAMUSCULAR ONCE
Status: COMPLETED | OUTPATIENT
Start: 2024-05-07 | End: 2024-05-07

## 2024-05-07 RX ADMIN — TRIAMCINOLONE ACETONIDE 40 MG: 40 INJECTION, SUSPENSION INTRA-ARTICULAR; INTRAMUSCULAR at 09:57

## 2024-05-07 RX ADMIN — TRIAMCINOLONE ACETONIDE 40 MG: 40 INJECTION, SUSPENSION INTRA-ARTICULAR; INTRAMUSCULAR at 09:58

## 2024-05-09 NOTE — PROGRESS NOTES
[facility]  Consultation Note  Virtual/Telephone Visit     Patient ID:  Name: Phi River Jr.  MRN: [id number]  AGE: 59 y.o.  : 1965    Date of Consultation:  May 10, 2024    CC:   Chief Complaint   Patient presents with    Back Pain         HPI:  Mr. River is a 59-year-old man who presents today for follow-up of low back pain.  He has pain in the central and bilateral low back, radiating to the bilateral legs, below the knees.  He characterizes the pain as sciatic nerves, sharp.  The pain awakens him at night.  Sometimes, it awakens him 8-10 times per night, but more recently it is awakening him 4 times per night.  The pain is aggravated with walking, especially on concrete floors.  The symptoms are mildly alleviated when he sits in a comfortable seat.  The pain severely affects his function.  The pain is associated with lower extremity paresthesias.  In the past, he had relief of his usual low back pain with intermittent bilateral L4-5 transforaminal epidural steroid injections, usually lasting for about 3 months.  More recently, the pain relief is becoming more short-lived.  He underwent bilateral L4-5 transforaminal epidural steroid injection 2024.  He had significant, profound pain relief, but it only lasted for 1 week.  He takes gabapentin 800 mg 4 times a day.  He continues to have severe pain in spite of that.    MRI lumbar spine 2024 was reviewed by me.  On my review of the films, he has advanced DDD L4-5-S1 with evidence of prior surgery L5-S1.  He has retrolisthesis L2-3 - 4 - 5.  There is multilevel DDD, spondylosis, and facet arthropathy of the lumbar spine.  He has significant bilateral lateral recess narrowing at L4-5, right greater than left and a central disc protrusion at L5-S1 resulting in bilateral L5-S1 neuroforaminal narrowing.  There is bilateral lateral recess narrowing at L2-3 and right lateral recess narrowing at L3-4.  We discussed

## 2024-05-10 ENCOUNTER — TELEMEDICINE (OUTPATIENT)
Dept: ORTHOPEDIC SURGERY | Age: 59
End: 2024-05-10
Payer: MEDICARE

## 2024-05-10 DIAGNOSIS — M54.16 LUMBAR RADICULOPATHY: Primary | ICD-10-CM

## 2024-05-10 DIAGNOSIS — M51.36 DISC DEGENERATION, LUMBAR: ICD-10-CM

## 2024-05-10 DIAGNOSIS — M47.816 LUMBAR SPONDYLOSIS: ICD-10-CM

## 2024-05-10 DIAGNOSIS — M51.26 DISC DISPLACEMENT, LUMBAR: ICD-10-CM

## 2024-05-10 PROCEDURE — G8417 CALC BMI ABV UP PARAM F/U: HCPCS | Performed by: PHYSICAL MEDICINE & REHABILITATION

## 2024-05-10 PROCEDURE — G8427 DOCREV CUR MEDS BY ELIG CLIN: HCPCS | Performed by: PHYSICAL MEDICINE & REHABILITATION

## 2024-05-10 PROCEDURE — 1036F TOBACCO NON-USER: CPT | Performed by: PHYSICAL MEDICINE & REHABILITATION

## 2024-05-10 PROCEDURE — 99214 OFFICE O/P EST MOD 30 MIN: CPT | Performed by: PHYSICAL MEDICINE & REHABILITATION

## 2024-05-10 PROCEDURE — 3017F COLORECTAL CA SCREEN DOC REV: CPT | Performed by: PHYSICAL MEDICINE & REHABILITATION

## 2024-05-10 RX ORDER — DULOXETIN HYDROCHLORIDE 30 MG/1
30 CAPSULE, DELAYED RELEASE ORAL DAILY
Qty: 45 CAPSULE | Refills: 0 | Status: SHIPPED | OUTPATIENT
Start: 2024-05-10

## 2024-06-26 ENCOUNTER — TELEPHONE (OUTPATIENT)
Dept: ORTHOPEDIC SURGERY | Age: 59
End: 2024-06-26

## 2024-06-26 DIAGNOSIS — M54.16 LUMBAR RADICULOPATHY: Primary | ICD-10-CM

## 2024-06-26 DIAGNOSIS — M47.816 LUMBAR SPONDYLOSIS: ICD-10-CM

## 2024-06-27 DIAGNOSIS — M25.561 PAIN IN BOTH KNEES, UNSPECIFIED CHRONICITY: ICD-10-CM

## 2024-06-27 DIAGNOSIS — M25.562 PAIN IN BOTH KNEES, UNSPECIFIED CHRONICITY: ICD-10-CM

## 2024-06-27 RX ORDER — DICLOFENAC SODIUM 75 MG/1
75 TABLET, DELAYED RELEASE ORAL 2 TIMES DAILY
Qty: 60 TABLET | Refills: 0 | OUTPATIENT
Start: 2024-06-27

## 2024-07-03 ENCOUNTER — OFFICE VISIT (OUTPATIENT)
Dept: ORTHOPEDIC SURGERY | Age: 59
End: 2024-07-03
Payer: MEDICARE

## 2024-07-03 DIAGNOSIS — M51.36 DISC DEGENERATION, LUMBAR: ICD-10-CM

## 2024-07-03 DIAGNOSIS — M54.16 LUMBAR RADICULOPATHY: Primary | ICD-10-CM

## 2024-07-03 DIAGNOSIS — M47.816 LUMBAR SPONDYLOSIS: ICD-10-CM

## 2024-07-03 PROCEDURE — 64483 NJX AA&/STRD TFRM EPI L/S 1: CPT | Performed by: PHYSICAL MEDICINE & REHABILITATION

## 2024-07-03 RX ORDER — BETAMETHASONE SODIUM PHOSPHATE AND BETAMETHASONE ACETATE 3; 3 MG/ML; MG/ML
12 INJECTION, SUSPENSION INTRA-ARTICULAR; INTRALESIONAL; INTRAMUSCULAR; SOFT TISSUE ONCE
Status: COMPLETED | OUTPATIENT
Start: 2024-07-03 | End: 2024-07-03

## 2024-07-03 RX ORDER — TRIAMCINOLONE ACETONIDE 40 MG/ML
40 INJECTION, SUSPENSION INTRA-ARTICULAR; INTRAMUSCULAR ONCE
Status: DISCONTINUED | OUTPATIENT
Start: 2024-07-03 | End: 2024-07-03

## 2024-07-03 RX ADMIN — BETAMETHASONE SODIUM PHOSPHATE AND BETAMETHASONE ACETATE 12 MG: 3; 3 INJECTION, SUSPENSION INTRA-ARTICULAR; INTRALESIONAL; INTRAMUSCULAR; SOFT TISSUE at 16:01

## 2024-07-03 NOTE — PROGRESS NOTES
Name: Phi River Jr.  YOB: 1965  Gender: male  MRN: [id number]        Transforaminal CARMEL Procedure Note    Procedure: Bilateral  L2-L3 transforaminal epidural steroid injections     Precautions: Phi River Jr. denies prior sensitivity to steroid, local anesthetic, iodine, or shellfish.       Consent:  Consent was obtained prior to the procedure. The procedure was discussed at length with Phi River Jr.. He was given the opportunity to ask questions regarding the procedure and its associated risks.  In addition to the potential risks associated with the procedure itself, the patient was informed both verbally and in writing of potential side effects of the use glucocorticoids.  The patient appeared to comprehend the informed consent and desired to have the procedure performed, and informed consent was signed.     He was placed in a prone position on the fluoroscopy table and the skin was prepped and draped in a routine sterile fashion. The areas to be injected were each anesthetized with 1 ml of 1% Lidocaine. A 25 gauge 3.5 inch spinal needle was carefully advanced under fluoroscopic guidance to the right L2-L3 transforaminal space  0.5 ml of 70% of Omnipaque was injected to confirm proper needle placement and absence of subdural or vascular flow Once proper placement was confirmed, 0.5ml of 0.25 marcaine and 0.5 mL 12 mg/mL betamethasone were injected through the spinal needle.       The above procedure was then repeated at the Left  L2-L3 transforaminal space.    Fluoroscopic guidance was used intermittently over a 10-minute period to insure proper needle placement and his safety. A hard copy of the fluoroscopic image has been placed in his chart and is saved on the C-arm hard drive. He was monitored for 30 minutes after the procedure and discharged home in a stable fashion with a routine follow up.    Procedural Diagnosis:     ICD-10-CM    1. Lumbar radiculopathy  M54.16 FL

## 2024-07-21 ASSESSMENT — LIFESTYLE VARIABLES
HOW OFTEN DO YOU HAVE A DRINK CONTAINING ALCOHOL: 1
HOW OFTEN DO YOU HAVE SIX OR MORE DRINKS ON ONE OCCASION: 1
HOW MANY STANDARD DRINKS CONTAINING ALCOHOL DO YOU HAVE ON A TYPICAL DAY: 0

## 2024-07-24 ENCOUNTER — OFFICE VISIT (OUTPATIENT)
Dept: FAMILY MEDICINE CLINIC | Facility: CLINIC | Age: 59
End: 2024-07-24

## 2024-07-24 VITALS
TEMPERATURE: 98.4 F | OXYGEN SATURATION: 98 % | WEIGHT: 227.4 LBS | BODY MASS INDEX: 30.14 KG/M2 | SYSTOLIC BLOOD PRESSURE: 112 MMHG | HEIGHT: 73 IN | DIASTOLIC BLOOD PRESSURE: 74 MMHG | HEART RATE: 71 BPM

## 2024-07-24 DIAGNOSIS — R73.09 ABNORMAL GLUCOSE: ICD-10-CM

## 2024-07-24 DIAGNOSIS — I10 PRIMARY HYPERTENSION: Primary | ICD-10-CM

## 2024-07-24 DIAGNOSIS — G89.29 CHRONIC BILATERAL LOW BACK PAIN WITH BILATERAL SCIATICA: ICD-10-CM

## 2024-07-24 DIAGNOSIS — Z00.00 MEDICARE ANNUAL WELLNESS VISIT, SUBSEQUENT: ICD-10-CM

## 2024-07-24 DIAGNOSIS — M54.41 CHRONIC BILATERAL LOW BACK PAIN WITH BILATERAL SCIATICA: ICD-10-CM

## 2024-07-24 DIAGNOSIS — E29.1 TESTOSTERONE DEFICIENCY IN MALE: ICD-10-CM

## 2024-07-24 DIAGNOSIS — Z12.5 SCREENING FOR PROSTATE CANCER: ICD-10-CM

## 2024-07-24 DIAGNOSIS — M54.42 CHRONIC BILATERAL LOW BACK PAIN WITH BILATERAL SCIATICA: ICD-10-CM

## 2024-07-24 DIAGNOSIS — E78.2 MIXED HYPERLIPIDEMIA: ICD-10-CM

## 2024-07-24 LAB
ALBUMIN SERPL-MCNC: 4.2 G/DL (ref 3.5–5)
ALBUMIN/GLOB SERPL: 1.5 (ref 1–1.9)
ALP SERPL-CCNC: 62 U/L (ref 40–129)
ALT SERPL-CCNC: 35 U/L (ref 12–65)
ANION GAP SERPL CALC-SCNC: 9 MMOL/L (ref 9–18)
AST SERPL-CCNC: 30 U/L (ref 15–37)
BASOPHILS # BLD: 0.1 K/UL (ref 0–0.2)
BASOPHILS NFR BLD: 1 % (ref 0–2)
BILIRUB SERPL-MCNC: 0.4 MG/DL (ref 0–1.2)
BILIRUBIN, URINE, POC: NEGATIVE
BLOOD URINE, POC: NEGATIVE
BUN SERPL-MCNC: 11 MG/DL (ref 6–23)
CALCIUM SERPL-MCNC: 9.7 MG/DL (ref 8.8–10.2)
CHLORIDE SERPL-SCNC: 99 MMOL/L (ref 98–107)
CHOLEST SERPL-MCNC: 261 MG/DL (ref 0–200)
CO2 SERPL-SCNC: 29 MMOL/L (ref 20–28)
CREAT SERPL-MCNC: 1 MG/DL (ref 0.8–1.3)
DIFFERENTIAL METHOD BLD: NORMAL
EOSINOPHIL # BLD: 0.1 K/UL (ref 0–0.8)
EOSINOPHIL NFR BLD: 1 % (ref 0.5–7.8)
ERYTHROCYTE [DISTWIDTH] IN BLOOD BY AUTOMATED COUNT: 12.3 % (ref 11.9–14.6)
EST. AVERAGE GLUCOSE BLD GHB EST-MCNC: 114 MG/DL
GLOBULIN SER CALC-MCNC: 2.8 G/DL (ref 2.3–3.5)
GLUCOSE SERPL-MCNC: 89 MG/DL (ref 70–99)
GLUCOSE URINE, POC: NEGATIVE
HBA1C MFR BLD: 5.6 % (ref 0–5.6)
HCT VFR BLD AUTO: 50 % (ref 41.1–50.3)
HDLC SERPL-MCNC: 61 MG/DL (ref 40–60)
HDLC SERPL: 4.3 (ref 0–5)
HGB BLD-MCNC: 17.1 G/DL (ref 13.6–17.2)
IMM GRANULOCYTES # BLD AUTO: 0 K/UL (ref 0–0.5)
IMM GRANULOCYTES NFR BLD AUTO: 0 % (ref 0–5)
KETONES, URINE, POC: NORMAL
LDLC SERPL CALC-MCNC: 170 MG/DL (ref 0–100)
LEUKOCYTE ESTERASE, URINE, POC: NEGATIVE
LYMPHOCYTES # BLD: 1.6 K/UL (ref 0.5–4.6)
LYMPHOCYTES NFR BLD: 31 % (ref 13–44)
MCH RBC QN AUTO: 31.7 PG (ref 26.1–32.9)
MCHC RBC AUTO-ENTMCNC: 34.2 G/DL (ref 31.4–35)
MCV RBC AUTO: 92.8 FL (ref 82–102)
MONOCYTES # BLD: 0.6 K/UL (ref 0.1–1.3)
MONOCYTES NFR BLD: 11 % (ref 4–12)
NEUTS SEG # BLD: 3 K/UL (ref 1.7–8.2)
NEUTS SEG NFR BLD: 56 % (ref 43–78)
NITRITE, URINE, POC: NEGATIVE
NRBC # BLD: 0 K/UL (ref 0–0.2)
PH, URINE, POC: 6.5 (ref 4.6–8)
PLATELET # BLD AUTO: 197 K/UL (ref 150–450)
PMV BLD AUTO: 9.7 FL (ref 9.4–12.3)
POTASSIUM SERPL-SCNC: 4.3 MMOL/L (ref 3.5–5.1)
PROT SERPL-MCNC: 7 G/DL (ref 6.3–8.2)
PROTEIN,URINE, POC: NEGATIVE
PSA SERPL-MCNC: 1 NG/ML (ref 0–4)
RBC # BLD AUTO: 5.39 M/UL (ref 4.23–5.6)
SODIUM SERPL-SCNC: 137 MMOL/L (ref 136–145)
SPECIFIC GRAVITY, URINE, POC: 1.01 (ref 1–1.03)
TRIGL SERPL-MCNC: 152 MG/DL (ref 0–150)
URINALYSIS CLARITY, POC: CLEAR
URINALYSIS COLOR, POC: YELLOW
UROBILINOGEN, POC: NORMAL
VLDLC SERPL CALC-MCNC: 30 MG/DL (ref 6–23)
WBC # BLD AUTO: 5.3 K/UL (ref 4.3–11.1)

## 2024-07-24 RX ORDER — METHYLPREDNISOLONE 4 MG/1
TABLET ORAL
Qty: 1 KIT | Refills: 1 | Status: SHIPPED | OUTPATIENT
Start: 2024-07-24 | End: 2024-07-30

## 2024-07-26 LAB — TESTOST SERPL-MCNC: 413 NG/DL (ref 264–916)

## 2024-07-27 NOTE — PROGRESS NOTES
Abnormal  On average, how many minutes do you engage in exercise at this level?: 0 min  Interventions:  Patient declined any further interventions or treatment     Abnormal BMI (obese):  Body mass index is 30 kg/m². (!) Abnormal  Interventions:  low carbohydrate diet        Dentist Screen:  Have you seen the dentist within the past year?: (!) No    Intervention:  Patient declines any further evaluation or treatment     Vision Screen:  Do you have difficulty driving, watching TV, or doing any of your daily activities because of your eyesight?: No  Have you had an eye exam within the past year?: (!) No  Interventions:   Patient declines any further evaluation or treatment    Safety:  Do you have non-slip mats or non-slip surfaces or shower bars or grab bars in your shower or bathtub?: (!) No  Interventions:  Patient declined any further interventions or treatment     Advanced Directives:  Do you have a Living Will?: (!) No    Intervention:  has NO advanced directive - not interested in additional information                     Objective   Vitals:    07/24/24 1124   BP: 112/74   Site: Left Upper Arm   Position: Sitting   Cuff Size: Large Adult   Pulse: 71   Temp: 98.4 °F (36.9 °C)   TempSrc: Skin   SpO2: 98%   Weight: 103.1 kg (227 lb 6.4 oz)   Height: 1.854 m (6' 1\")      Body mass index is 30 kg/m².                  No Known Allergies  Prior to Visit Medications    Medication Sig Taking? Authorizing Provider   methylPREDNISolone (MEDROL DOSEPACK) 4 MG tablet Take by mouth. Yes Russell Page MD   diclofenac (VOLTAREN) 75 MG EC tablet Take 1 tablet by mouth 2 times daily  Lul Cruz MD   losartan-hydroCHLOROthiazide (HYZAAR) 100-12.5 MG per tablet Take 1 tablet by mouth daily  Russell Paeg MD   hydroCHLOROthiazide (MICROZIDE) 12.5 MG capsule Take 1 capsule by mouth every morning  Patient not taking: Reported on 4/5/2024  Russell Page MD   losartan (COZAAR) 100 MG tablet Take 1

## 2024-07-27 NOTE — PATIENT INSTRUCTIONS

## 2024-08-29 ENCOUNTER — TELEPHONE (OUTPATIENT)
Dept: ORTHOPEDIC SURGERY | Age: 59
End: 2024-08-29

## 2024-08-30 ENCOUNTER — PATIENT MESSAGE (OUTPATIENT)
Dept: ORTHOPEDIC SURGERY | Age: 59
End: 2024-08-30

## 2024-08-30 ENCOUNTER — TELEPHONE (OUTPATIENT)
Dept: ORTHOPEDIC SURGERY | Age: 59
End: 2024-08-30

## 2024-08-30 DIAGNOSIS — M47.816 LUMBAR SPONDYLOSIS: ICD-10-CM

## 2024-08-30 DIAGNOSIS — M51.36 DISC DEGENERATION, LUMBAR: ICD-10-CM

## 2024-08-30 DIAGNOSIS — M54.16 LUMBAR RADICULOPATHY: Primary | ICD-10-CM

## 2024-08-30 NOTE — TELEPHONE ENCOUNTER
He stated that it did help some with the groin pain, he said that it is not bothering him as bad as it was before, but he worst pain is sciatic pain on both side. He is having a hard time standing up.

## 2024-08-30 NOTE — TELEPHONE ENCOUNTER
Call returned and patient has been scheduled.   
He missed Meggan's call. Please try him again.   
no

## 2024-08-30 NOTE — TELEPHONE ENCOUNTER
Spoke with patient and he said the last injection did help some, but is having pain in the lower back going down into his buttocks and into the groin on the right side at times.

## 2024-09-11 ENCOUNTER — OFFICE VISIT (OUTPATIENT)
Dept: ORTHOPEDIC SURGERY | Age: 59
End: 2024-09-11
Payer: MEDICARE

## 2024-09-11 DIAGNOSIS — M51.36 DISC DEGENERATION, LUMBAR: ICD-10-CM

## 2024-09-11 DIAGNOSIS — M47.816 LUMBAR SPONDYLOSIS: ICD-10-CM

## 2024-09-11 DIAGNOSIS — M54.16 LUMBAR RADICULOPATHY: Primary | ICD-10-CM

## 2024-09-11 PROCEDURE — 64483 NJX AA&/STRD TFRM EPI L/S 1: CPT | Performed by: PHYSICAL MEDICINE & REHABILITATION

## 2024-09-11 RX ORDER — TRIAMCINOLONE ACETONIDE 40 MG/ML
40 INJECTION, SUSPENSION INTRA-ARTICULAR; INTRAMUSCULAR ONCE
Status: COMPLETED | OUTPATIENT
Start: 2024-09-11 | End: 2024-09-11

## 2024-09-11 RX ADMIN — TRIAMCINOLONE ACETONIDE 40 MG: 40 INJECTION, SUSPENSION INTRA-ARTICULAR; INTRAMUSCULAR at 09:27

## 2024-09-17 ENCOUNTER — OFFICE VISIT (OUTPATIENT)
Dept: ORTHOPEDIC SURGERY | Age: 59
End: 2024-09-17
Payer: MEDICARE

## 2024-09-17 DIAGNOSIS — M25.561 PAIN IN BOTH KNEES, UNSPECIFIED CHRONICITY: Primary | ICD-10-CM

## 2024-09-17 DIAGNOSIS — I10 PRIMARY HYPERTENSION: ICD-10-CM

## 2024-09-17 DIAGNOSIS — M17.11 PRIMARY OSTEOARTHRITIS OF RIGHT KNEE: ICD-10-CM

## 2024-09-17 DIAGNOSIS — M17.12 PRIMARY OSTEOARTHRITIS OF LEFT KNEE: ICD-10-CM

## 2024-09-17 DIAGNOSIS — M25.562 PAIN IN BOTH KNEES, UNSPECIFIED CHRONICITY: Primary | ICD-10-CM

## 2024-09-17 DIAGNOSIS — G62.89 OTHER POLYNEUROPATHY: ICD-10-CM

## 2024-09-17 PROCEDURE — 1036F TOBACCO NON-USER: CPT | Performed by: PHYSICIAN ASSISTANT

## 2024-09-17 PROCEDURE — 99213 OFFICE O/P EST LOW 20 MIN: CPT | Performed by: PHYSICIAN ASSISTANT

## 2024-09-17 PROCEDURE — G8428 CUR MEDS NOT DOCUMENT: HCPCS | Performed by: PHYSICIAN ASSISTANT

## 2024-09-17 PROCEDURE — 20610 DRAIN/INJ JOINT/BURSA W/O US: CPT | Performed by: PHYSICIAN ASSISTANT

## 2024-09-17 PROCEDURE — 3017F COLORECTAL CA SCREEN DOC REV: CPT | Performed by: PHYSICIAN ASSISTANT

## 2024-09-17 PROCEDURE — G8417 CALC BMI ABV UP PARAM F/U: HCPCS | Performed by: PHYSICIAN ASSISTANT

## 2024-09-17 RX ORDER — TRIAMCINOLONE ACETONIDE 40 MG/ML
40 INJECTION, SUSPENSION INTRA-ARTICULAR; INTRAMUSCULAR ONCE
Status: COMPLETED | OUTPATIENT
Start: 2024-09-17 | End: 2024-09-17

## 2024-09-17 RX ORDER — GABAPENTIN 800 MG/1
800 TABLET ORAL 4 TIMES DAILY
Qty: 360 TABLET | Refills: 3 | Status: SHIPPED | OUTPATIENT
Start: 2024-09-17 | End: 2025-09-17

## 2024-09-17 RX ORDER — LOSARTAN POTASSIUM AND HYDROCHLOROTHIAZIDE 12.5; 1 MG/1; MG/1
1 TABLET ORAL DAILY
Qty: 90 TABLET | Refills: 2 | Status: SHIPPED | OUTPATIENT
Start: 2024-09-17

## 2024-09-17 RX ADMIN — TRIAMCINOLONE ACETONIDE 40 MG: 40 INJECTION, SUSPENSION INTRA-ARTICULAR; INTRAMUSCULAR at 14:00

## 2024-10-11 ENCOUNTER — OFFICE VISIT (OUTPATIENT)
Dept: VASCULAR SURGERY | Age: 59
End: 2024-10-11
Payer: MEDICARE

## 2024-10-11 VITALS
OXYGEN SATURATION: 98 % | HEIGHT: 73 IN | DIASTOLIC BLOOD PRESSURE: 86 MMHG | WEIGHT: 221 LBS | BODY MASS INDEX: 29.29 KG/M2 | SYSTOLIC BLOOD PRESSURE: 133 MMHG | HEART RATE: 66 BPM

## 2024-10-11 DIAGNOSIS — I73.9 PVD (PERIPHERAL VASCULAR DISEASE) WITH CLAUDICATION (HCC): Primary | ICD-10-CM

## 2024-10-11 PROCEDURE — 1036F TOBACCO NON-USER: CPT | Performed by: SURGERY

## 2024-10-11 PROCEDURE — 3017F COLORECTAL CA SCREEN DOC REV: CPT | Performed by: SURGERY

## 2024-10-11 PROCEDURE — G8427 DOCREV CUR MEDS BY ELIG CLIN: HCPCS | Performed by: SURGERY

## 2024-10-11 PROCEDURE — 99213 OFFICE O/P EST LOW 20 MIN: CPT | Performed by: SURGERY

## 2024-10-11 PROCEDURE — G8484 FLU IMMUNIZE NO ADMIN: HCPCS | Performed by: SURGERY

## 2024-10-11 PROCEDURE — G8417 CALC BMI ABV UP PARAM F/U: HCPCS | Performed by: SURGERY

## 2024-10-11 NOTE — PROGRESS NOTES
[address] [address]  [phone] FAX: [phone]    Phi River Jr.  : 1965    Chief Complaint:     History of Present Illness:   Patient follows up today for follow-up duplex study.  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  Patient denies any claudication or rest pain symptoms.  He is having some knee issues with his seeing [place] [facility].    CURRENT MEDICATIONS:  Current Outpatient Medications   Medication Sig Dispense Refill    gabapentin (NEURONTIN) 800 MG tablet Take 1 tablet by mouth 4 times daily. 360 tablet 3    losartan-hydroCHLOROthiazide (HYZAAR) 100-12.5 MG per tablet Take 1 tablet by mouth daily 90 tablet 2    diclofenac (VOLTAREN) 75 MG EC tablet Take 1 tablet by mouth 2 times daily 60 tablet 1    promethazine (PHENERGAN) 25 MG tablet Take 1 tablet by mouth daily as needed for Nausea 30 tablet 3    valACYclovir (VALTREX) 500 MG tablet Take 1 tablet by mouth 2 times daily 180 tablet 3     No current facility-administered medications for this visit.       Past Medical History:   Diagnosis Date    Backache, unspecified 2015    Chronic back pain     Neuropathy     Pain in joint, multiple sites 2015    Positive sm/RNP antibody 2015       Physical Examination:   Height: 1.854 m (6' 1\"), Weight - Scale: 100.2 kg (221 lb), BP: 133/86    Constitutional: he appears well-developed. No distress.   HENT:   Head: Atraumatic.   Eyes: Pupils are equal, round, and reactive to light.   Neck: Normal range of motion.   Cardiovascular: Regular rhythm.    Pulmonary/Chest: Effort normal and breath sounds normal. No respiratory distress.   Abdominal: Soft. Bowel sounds are normal. he exhibits no distension. There is no tenderness. There is no guarding. No hernia.   Musculoskeletal: Normal range of motion.   Neurological: He is alert. CN II- XII grossly intact   Vascular: Femoral pulses palpable pedal pulses ELICIA    Imagin with

## 2024-10-26 DIAGNOSIS — H60.333 CHRONIC SWIMMER'S EAR OF BOTH SIDES: Primary | ICD-10-CM

## 2024-10-26 RX ORDER — NEOMYCIN SULFATE, POLYMYXIN B SULFATE, HYDROCORTISONE 3.5; 10000; 1 MG/ML; [USP'U]/ML; MG/ML
4 SOLUTION/ DROPS AURICULAR (OTIC) 3 TIMES DAILY
Qty: 10 ML | Refills: 2 | Status: SHIPPED | OUTPATIENT
Start: 2024-10-26 | End: 2024-12-15

## 2024-10-29 ENCOUNTER — OFFICE VISIT (OUTPATIENT)
Dept: ORTHOPEDIC SURGERY | Age: 59
End: 2024-10-29
Payer: MEDICARE

## 2024-10-29 DIAGNOSIS — M75.41 IMPINGEMENT SYNDROME OF RIGHT SHOULDER: Primary | ICD-10-CM

## 2024-10-29 PROCEDURE — 3017F COLORECTAL CA SCREEN DOC REV: CPT | Performed by: PHYSICIAN ASSISTANT

## 2024-10-29 PROCEDURE — 1036F TOBACCO NON-USER: CPT | Performed by: PHYSICIAN ASSISTANT

## 2024-10-29 PROCEDURE — 99213 OFFICE O/P EST LOW 20 MIN: CPT | Performed by: PHYSICIAN ASSISTANT

## 2024-10-29 RX ORDER — TRIAMCINOLONE ACETONIDE 40 MG/ML
40 INJECTION, SUSPENSION INTRA-ARTICULAR; INTRAMUSCULAR ONCE
Status: COMPLETED | OUTPATIENT
Start: 2024-10-29 | End: 2024-10-29

## 2024-10-29 RX ADMIN — TRIAMCINOLONE ACETONIDE 40 MG: 40 INJECTION, SUSPENSION INTRA-ARTICULAR; INTRAMUSCULAR at 14:39

## 2024-10-29 NOTE — PROGRESS NOTES
Name: Phi River Jr.  YOB: 1965  Gender: male  MRN: [id number]    CC: Shoulder Pain (BL)     HPI: Phi River Jr. is a 59 y.o. male who returns for follow up on bilateral shoulders. His left shoulder is much better. His right shoulder pain returned after recent fall.  He states he was working at one of the [employer] up near [employer] when he fell onto this right shoulder.  He states after the fall he was not able to move the shoulder much but notes he is about 75% better since the fall.  He can now move the shoulder but is still having some pain.  He states overall the shoulders are much better the right one just got worse after this fall.  He states he does not feel as though this right shoulder is weak at this time.    Physical Examination:  General: no acute distress  Lungs: breathing easily  CV: regular rhythm by pulse  Right Shoulder: Full range of motion present.  5 out of 5 rotator cuff strength.  Positive Babcock and Neer sign.  Positive [place]'s.  He is tender to palpate along the biceps tendon.  No gross deformity noted.  No abrasions or sores noted.  Sensation intact distally.  Distal radius pulse 2+.  5 out of 5  strength noted.    Left shoulder: Full range of motion.  5 out of 5 rotator cuff strength.  Sensation intact distally distal radius pulse 2+.  Negative impingement signs today.  Negative [place]o's.    Assessment:     ICD-10-CM    1. Impingement syndrome of right shoulder  M75.41 triamcinolone acetonide (KENALOG-40) injection 40 mg     DRAIN/INJECT LARGE JOINT/BURSA          Plan:     Discussed with Mr. River today that with this acute injury we can proceed with an MRI.  He states he really does not want to do that if at all possible he would like to try conservative management first.  Since he did not have any weakness today on exam I do think this is appropriate.  He also has continued to see improvement with time and I think he will continue to see improvement.  We

## 2025-01-13 ENCOUNTER — PATIENT MESSAGE (OUTPATIENT)
Dept: ORTHOPEDIC SURGERY | Age: 60
End: 2025-01-13

## 2025-01-16 ENCOUNTER — OFFICE VISIT (OUTPATIENT)
Dept: ORTHOPEDIC SURGERY | Age: 60
End: 2025-01-16
Payer: MEDICARE

## 2025-01-16 DIAGNOSIS — M75.41 IMPINGEMENT SYNDROME OF RIGHT SHOULDER: ICD-10-CM

## 2025-01-16 DIAGNOSIS — M17.11 PRIMARY OSTEOARTHRITIS OF RIGHT KNEE: Primary | ICD-10-CM

## 2025-01-16 PROCEDURE — G8428 CUR MEDS NOT DOCUMENT: HCPCS | Performed by: PHYSICIAN ASSISTANT

## 2025-01-16 PROCEDURE — G8417 CALC BMI ABV UP PARAM F/U: HCPCS | Performed by: PHYSICIAN ASSISTANT

## 2025-01-16 PROCEDURE — 1036F TOBACCO NON-USER: CPT | Performed by: PHYSICIAN ASSISTANT

## 2025-01-16 PROCEDURE — 99213 OFFICE O/P EST LOW 20 MIN: CPT | Performed by: PHYSICIAN ASSISTANT

## 2025-01-16 PROCEDURE — 3017F COLORECTAL CA SCREEN DOC REV: CPT | Performed by: PHYSICIAN ASSISTANT

## 2025-01-16 PROCEDURE — 20610 DRAIN/INJ JOINT/BURSA W/O US: CPT | Performed by: PHYSICIAN ASSISTANT

## 2025-01-16 RX ORDER — TRIAMCINOLONE ACETONIDE 40 MG/ML
40 INJECTION, SUSPENSION INTRA-ARTICULAR; INTRAMUSCULAR ONCE
Status: COMPLETED | OUTPATIENT
Start: 2025-01-16 | End: 2025-01-16

## 2025-01-16 RX ADMIN — TRIAMCINOLONE ACETONIDE 40 MG: 40 INJECTION, SUSPENSION INTRA-ARTICULAR; INTRAMUSCULAR at 15:16

## 2025-01-16 NOTE — PROGRESS NOTES
Name: Phi River Jr.  YOB: 1965  Gender: male  MRN: [id number]    CC: Knee Pain (R)         Shoulder Pain (R)     HPI: Phi River Jr. is a 59 y.o. male who returns for follow up on right knee and right shoulder. His last shoulder injection was 10/29/24 and the last knee injection was 9/18/24.  He states the knee injection only lasted about a month whereas the shoulder injection lasted until about 2 weeks ago.  He notes overall the shoulder has improved significantly.  He would like another shot today in the shoulder.  He states the knee is causing some mechanical symptoms.  He notes grinding and anterior joint line pain.  He denies any catching or locking but states the patella grinds and causes pain.  He would like to discuss options on this right knee.    Physical Examination:  General: no acute distress  Lungs: breathing easily  CV: regular rhythm by pulse  Right Knee: Patella tracks centrally with 2+ patellofemoral grind noted.  He is tender when I am move the patella around.  Ligamentously stable.  Calf is soft nontender outpatient.  Dorsi and plantarflexion makes intact.  He does have pain when I hyperextend him anteriorly.  He only hyperextends about 2 degrees.  He has 130 degrees of flexion with pain with forced flexion.    Right shoulder: Positive impingement signs.  Discussed in tenderness in the biceps tendon.  5 out of 5 rotator cuff strength.  Full shoulder range of motion present.    Assessment:     ICD-10-CM    1. Primary osteoarthritis of right knee  M17.11 DRAIN/INJECT LARGE JOINT/BURSA     DRAIN/INJECT LARGE JOINT/BURSA     triamcinolone acetonide (KENALOG-40) injection 40 mg     triamcinolone acetonide (KENALOG-40) injection 40 mg     Ambulatory Referral to Ortho Injection      2. Impingement syndrome of right shoulder  M75.41 DRAIN/INJECT LARGE JOINT/BURSA     DRAIN/INJECT LARGE JOINT/BURSA     triamcinolone acetonide (KENALOG-40) injection 40 mg     triamcinolone

## 2025-02-04 NOTE — PROGRESS NOTES
Name: Phi River Jr.  YOB: 1965  Gender: male  MRN: [id number]    CC: Knee Pain (R)     HPI: Phi River Jr. is a 59 y.o. male who presents first for injection of Euflexxa.     Physical Examination:  General: no acute distress  right Knee: Exam is unchanged, the knee continues to be painful with palpation and range of motion. There is no effusion or concern for infection.    Assessment:   1. Primary osteoarthritis of right knee       Plan:     Right knee injected from a anterior lateral approach with 2 mL Euflexxa viscosupplementation after sterile prep.  Patient tolerated the procedure well was given postinjection flare precautions.      Follow up for second injection next week.     Carine Toney PA-C  Orthopaedics and Sports Medicine

## 2025-02-06 ENCOUNTER — OFFICE VISIT (OUTPATIENT)
Dept: ORTHOPEDIC SURGERY | Age: 60
End: 2025-02-06
Payer: MEDICARE

## 2025-02-06 DIAGNOSIS — M17.11 PRIMARY OSTEOARTHRITIS OF RIGHT KNEE: Primary | ICD-10-CM

## 2025-02-06 PROCEDURE — 20610 DRAIN/INJ JOINT/BURSA W/O US: CPT | Performed by: PHYSICIAN ASSISTANT

## 2025-02-06 RX ORDER — HYALURONATE SODIUM 10 MG/ML
20 SYRINGE (ML) INTRAARTICULAR ONCE
Status: COMPLETED | OUTPATIENT
Start: 2025-02-06 | End: 2025-02-06

## 2025-02-06 RX ADMIN — Medication 20 MG: at 10:54

## 2025-02-11 NOTE — PROGRESS NOTES
Name: Phi River Jr.  YOB: 1965  Gender: male  MRN: [id number]    CC: Knee Pain (R)     HPI: Phi River Jr. is a 59 y.o. male who presents second for injection of Euflexxa.       Physical Examination:  General: no acute distress  right Knee: Exam is unchanged, the knee continues to be painful with palpation and range of motion. There is no effusion or concern for infection.    Assessment:   1. Primary osteoarthritis of right knee    2. Primary osteoarthritis of left knee         Plan:     Right knee injected from a anterior lateral approach with 2 mL Euflexxa viscosupplementation after sterile prep.  Patient tolerated the procedure well was given postinjection flare precautions.      Follow up for third injection next week.     He is responding well to these cortisone injections in the right knee.  He did receive a intra-articular steroid injection in the left knee and states it did help but has not lasted as long as he was hoping.  He would like to proceed with Visco injections in the left knee as well.  We will put these in for approval today.      Carine Toney PA-C  Orthopaedics and Sports Medicine

## 2025-02-14 ENCOUNTER — OFFICE VISIT (OUTPATIENT)
Dept: ORTHOPEDIC SURGERY | Age: 60
End: 2025-02-14

## 2025-02-14 DIAGNOSIS — M17.12 PRIMARY OSTEOARTHRITIS OF LEFT KNEE: ICD-10-CM

## 2025-02-14 DIAGNOSIS — M17.11 PRIMARY OSTEOARTHRITIS OF RIGHT KNEE: Primary | ICD-10-CM

## 2025-02-14 RX ORDER — HYALURONATE SODIUM 10 MG/ML
20 SYRINGE (ML) INTRAARTICULAR ONCE
Status: SHIPPED | OUTPATIENT
Start: 2025-02-14

## 2025-02-18 ENCOUNTER — TELEPHONE (OUTPATIENT)
Dept: ORTHOPEDIC SURGERY | Age: 60
End: 2025-02-18

## 2025-02-18 DIAGNOSIS — M48.062 SPINAL STENOSIS OF LUMBAR REGION WITH NEUROGENIC CLAUDICATION: ICD-10-CM

## 2025-02-18 DIAGNOSIS — M47.816 LUMBAR SPONDYLOSIS: ICD-10-CM

## 2025-02-18 DIAGNOSIS — M54.16 LUMBAR RADICULOPATHY: Primary | ICD-10-CM

## 2025-02-18 DIAGNOSIS — M51.26 DISC DISPLACEMENT, LUMBAR: ICD-10-CM

## 2025-02-18 NOTE — TELEPHONE ENCOUNTER
Appointment Request  (Newest Message First)  Phi River Jr. \"Cortez\"  [facility]  Staff20 hours ago (1:44 PM)       Appointment Request From: Phi River Jr.     With Provider: Dr. Jluiet Zambrano MD [[facility]]     Preferred Date Range: Any     Preferred Times: Any Time     Reason for visit: Request an Appointment     Comments:  Back Injection for back pain

## 2025-02-20 ENCOUNTER — OFFICE VISIT (OUTPATIENT)
Dept: ORTHOPEDIC SURGERY | Age: 60
End: 2025-02-20

## 2025-02-20 DIAGNOSIS — M17.11 PRIMARY OSTEOARTHRITIS OF RIGHT KNEE: Primary | ICD-10-CM

## 2025-02-20 RX ORDER — HYALURONATE SODIUM 10 MG/ML
20 SYRINGE (ML) INTRAARTICULAR ONCE
Status: COMPLETED | OUTPATIENT
Start: 2025-02-20 | End: 2025-02-20

## 2025-02-20 RX ADMIN — Medication 20 MG: at 11:01

## 2025-02-20 NOTE — PROGRESS NOTES
Name: Phi River Jr.  YOB: 1965  Gender: male  MRN: [id number]    CC: Knee Pain (R)     HPI: Phi River Jr. is a 59 y.o. male who presents third for injection of Euflexxa.     Physical Examination:  General: no acute distress  right Knee: Exam is unchanged, the knee continues to be painful with palpation and range of motion. There is no effusion or concern for infection.    Assessment:   1. Primary osteoarthritis of right knee       Plan:     Right knee injected from a anterior lateral approach with 2 mL Euflexxa viscosupplementation after sterile prep.  Patient tolerated the procedure well was given postinjection flare precautions.      Follow up in 6 weeks for the right knee. He will return to start the series in his left knee once approved through insurance.       Carine Toney PA-C  Orthopaedics and Sports Medicine

## 2025-02-26 ENCOUNTER — OFFICE VISIT (OUTPATIENT)
Dept: ORTHOPEDIC SURGERY | Age: 60
End: 2025-02-26
Payer: MEDICARE

## 2025-02-26 DIAGNOSIS — M51.26 DISC DISPLACEMENT, LUMBAR: ICD-10-CM

## 2025-02-26 DIAGNOSIS — M54.16 LUMBAR RADICULOPATHY: Primary | ICD-10-CM

## 2025-02-26 DIAGNOSIS — M47.816 LUMBAR SPONDYLOSIS: ICD-10-CM

## 2025-02-26 DIAGNOSIS — M48.062 SPINAL STENOSIS OF LUMBAR REGION WITH NEUROGENIC CLAUDICATION: ICD-10-CM

## 2025-02-26 PROCEDURE — 64483 NJX AA&/STRD TFRM EPI L/S 1: CPT | Performed by: PHYSICAL MEDICINE & REHABILITATION

## 2025-02-26 RX ORDER — TRIAMCINOLONE ACETONIDE 40 MG/ML
40 INJECTION, SUSPENSION INTRA-ARTICULAR; INTRAMUSCULAR ONCE
Status: COMPLETED | OUTPATIENT
Start: 2025-02-26 | End: 2025-02-26

## 2025-02-26 RX ADMIN — TRIAMCINOLONE ACETONIDE 40 MG: 40 INJECTION, SUSPENSION INTRA-ARTICULAR; INTRAMUSCULAR at 09:45

## 2025-02-26 NOTE — PROGRESS NOTES
Name: Phi River Jr.  YOB: 1965  Gender: male  MRN: [id number]        Transforaminal CARMEL Procedure Note    Procedure: Bilateral  L5-S1 transforaminal epidural steroid injections     Precautions: Phi River Jr. denies prior sensitivity to steroid, local anesthetic, iodine, or shellfish.       Consent:  Consent was obtained prior to the procedure. The procedure was discussed at length with Phi River Jr.. He was given the opportunity to ask questions regarding the procedure and its associated risks.  In addition to the potential risks associated with the procedure itself, the patient was informed both verbally and in writing of potential side effects of the use glucocorticoids.  The patient appeared to comprehend the informed consent and desired to have the procedure performed, and informed consent was signed.     He was placed in a prone position on the fluoroscopy table and the skin was prepped and draped in a routine sterile fashion. The areas to be injected were each anesthetized with 1 ml of 1% Lidocaine. A 22 gauge 3.5 inch spinal needle was carefully advanced under fluoroscopic guidance to the right L5-S1 transforaminal space  0.5 ml of 70% of Omnipaque was injected to confirm proper needle placement and absence of subdural or vascular flow Once proper placement was confirmed, 0.5ml of 0.25 marcaine and 40 mg kenalog were injected through the spinal needle.       The above procedure was then repeated at the Left  L5-S1 transforaminal space.    Fluoroscopic guidance was used intermittently over a 10-minute period to insure proper needle placement and his safety. A hard copy of the fluoroscopic image has been placed in his chart and is saved on the C-arm hard drive. He was monitored for 30 minutes after the procedure and discharged home in a stable fashion with a routine follow up.    Procedural Diagnosis:     ICD-10-CM    1. Lumbar radiculopathy  M54.16 FL NERVE BLOCK

## 2025-02-27 ENCOUNTER — OFFICE VISIT (OUTPATIENT)
Dept: ORTHOPEDIC SURGERY | Age: 60
End: 2025-02-27

## 2025-02-27 DIAGNOSIS — M17.12 PRIMARY OSTEOARTHRITIS OF LEFT KNEE: Primary | ICD-10-CM

## 2025-02-27 RX ORDER — HYALURONATE SODIUM 10 MG/ML
20 SYRINGE (ML) INTRAARTICULAR ONCE
Status: COMPLETED | OUTPATIENT
Start: 2025-02-27 | End: 2025-02-27

## 2025-02-27 RX ADMIN — Medication 20 MG: at 08:06

## 2025-02-27 NOTE — PROGRESS NOTES
Name: Phi River Jr.  YOB: 1965  Gender: male  MRN: [id number]    CC: Knee Pain (L)     HPI: Phi River Jr. is a 59 y.o. male who presents first for injection of Euflexxa.     Physical Examination:  General: no acute distress  left Knee: Exam is unchanged, the knee continues to be painful with palpation and range of motion. There is no effusion or concern for infection.    Assessment:   1. Primary osteoarthritis of left knee         Plan:     Left knee injected from a anterior lateral approach with 2 mL Euflexxa viscosupplementation after sterile prep.  Patient tolerated the procedure well was given postinjection flare precautions.      Follow up for second injection next week.       Carine Toney PA-C  Orthopaedics and Sports Medicine

## 2025-03-04 NOTE — PROGRESS NOTES
Name: Phi River Jr.  YOB: 1965  Gender: male  MRN: [id number]    CC: Knee Pain (L)     HPI: Phi River Jr. is a 59 y.o. male who presents second for injection of Euflexxa.     Physical Examination:  General: no acute distress  left Knee: Exam is unchanged, the knee continues to be painful with palpation and range of motion. There is no effusion or concern for infection.    Assessment:   1. Primary osteoarthritis of left knee       Plan:     Left knee injected from a anterior lateral approach with 2 mL Euflexxa viscosupplementation after sterile prep.  Patient tolerated the procedure well was given postinjection flare precautions.      Follow up for third injection next week.     Carine Toney PA-C  Orthopaedics and Sports Medicine

## 2025-03-06 ENCOUNTER — OFFICE VISIT (OUTPATIENT)
Dept: ORTHOPEDIC SURGERY | Age: 60
End: 2025-03-06

## 2025-03-06 DIAGNOSIS — M17.12 PRIMARY OSTEOARTHRITIS OF LEFT KNEE: Primary | ICD-10-CM

## 2025-03-06 RX ORDER — HYALURONATE SODIUM 10 MG/ML
20 SYRINGE (ML) INTRAARTICULAR ONCE
Status: COMPLETED | OUTPATIENT
Start: 2025-03-06 | End: 2025-03-06

## 2025-03-06 RX ADMIN — Medication 20 MG: at 08:23

## 2025-03-13 ENCOUNTER — OFFICE VISIT (OUTPATIENT)
Dept: ORTHOPEDIC SURGERY | Age: 60
End: 2025-03-13

## 2025-03-13 DIAGNOSIS — M17.12 PRIMARY OSTEOARTHRITIS OF LEFT KNEE: Primary | ICD-10-CM

## 2025-03-13 DIAGNOSIS — J01.90 ACUTE BACTERIAL SINUSITIS: Primary | ICD-10-CM

## 2025-03-13 DIAGNOSIS — B96.89 ACUTE BACTERIAL SINUSITIS: Primary | ICD-10-CM

## 2025-03-13 RX ORDER — HYALURONATE SODIUM 10 MG/ML
20 SYRINGE (ML) INTRAARTICULAR ONCE
Status: COMPLETED | OUTPATIENT
Start: 2025-03-13 | End: 2025-03-13

## 2025-03-13 RX ORDER — AMOXICILLIN 500 MG/1
500 CAPSULE ORAL 3 TIMES DAILY
Qty: 30 CAPSULE | Refills: 1 | Status: SHIPPED | OUTPATIENT
Start: 2025-03-13 | End: 2025-04-02

## 2025-03-13 RX ADMIN — Medication 20 MG: at 08:15

## 2025-03-13 NOTE — PROGRESS NOTES
Name: Phi River Jr.  YOB: 1965  Gender: male  MRN: [id number]    CC: Knee Pain (L)     HPI: Phi River Jr. is a 59 y.o. male who presents third for injection of Euflexxa.     Physical Examination:  General: no acute distress  left Knee: Exam is unchanged, the knee continues to be painful with palpation and range of motion. There is no effusion or concern for infection.    Assessment:   1. Primary osteoarthritis of left knee       Plan:     Left knee injected from a anterior lateral approach with 2 mL Euflexxa viscosupplementation after sterile prep.  Patient tolerated the procedure well was given postinjection flare precautions.      Follow up as needed.    Carine Toney PA-C  Orthopaedics and Sports Medicine

## 2025-04-22 ENCOUNTER — PATIENT MESSAGE (OUTPATIENT)
Dept: ORTHOPEDIC SURGERY | Age: 60
End: 2025-04-22

## 2025-04-23 ENCOUNTER — TELEPHONE (OUTPATIENT)
Dept: ORTHOPEDIC SURGERY | Age: 60
End: 2025-04-23

## 2025-04-23 NOTE — TELEPHONE ENCOUNTER
Pt has  5/8 [facility] shoulder injection  appt   Let me know  if you can see him sooner and at [facility]  please   And I have him on a wait list    Thank you

## 2025-04-29 ENCOUNTER — OFFICE VISIT (OUTPATIENT)
Dept: ORTHOPEDIC SURGERY | Age: 60
End: 2025-04-29

## 2025-04-29 DIAGNOSIS — M75.42 SHOULDER IMPINGEMENT SYNDROME, LEFT: Primary | ICD-10-CM

## 2025-04-29 DIAGNOSIS — M75.41 SHOULDER IMPINGEMENT SYNDROME, RIGHT: ICD-10-CM

## 2025-04-29 NOTE — PROGRESS NOTES
Name: Phi River Jr.  YOB: 1965  Gender: male  MRN: [id number]    CC: Shoulder Pain (B)     HPI: Phi River Jr. is a 60 y.o. male who returns for follow up for Shoulder Pain (B)    The patient is requesting a bilateral steroid injection today. On 25, they received a RIGHT ONLY SA injection with good relief for 3 months. May of 2024 was his last injection for the left shoulder.  He notes the left is much worse than the right at this time.  He notes that the left injection helped for about 9 months total.  He notes the right injection started to wear off a couple weeks ago.  He is here today to discuss another round of bilateral injections.    Physical Examination:  General: no acute distress  Lungs: breathing easily  CV: regular rhythm by pulse  Right Shoulder and Left Shoulder:  Positive impingement sign's.  5/5 rotator cuff strength.  Has full range of motion with pain.  Motor sensor intact distally.  Distal radius pulse 2+.  Negative [place]'s.    Assessment:     ICD-10-CM    1. Shoulder impingement syndrome, left  M75.42       2. Shoulder impingement syndrome, right  M75.41         Plan:    Discussed Mr. River that he continues to have impingement symptoms.  We did discuss risk-benefit indications of proceeding with those injections today.  He would like to proceed with both if we can.  He understands he is to continue work on a home exercise program.  He will follow-up as needed.    Before beginning this procedure, the procedure was explained in detail. The patient's name and  were confirmed. The body part and laterality were identified and agreed upon by the patient and myself.  The patient elected to proceed with a subacromial injection today.  After verbal informed consent was obtained after sterile prep the bilateral shoulder subacromial space was injected from a posterior lateral approach with 6 cc of 0.25% Marcaine and 1 cc of 40 mg Kenalog.  The patient tolerated the

## 2025-05-08 ENCOUNTER — OFFICE VISIT (OUTPATIENT)
Dept: ORTHOPEDIC SURGERY | Age: 60
End: 2025-05-08

## 2025-05-08 DIAGNOSIS — M17.11 PRIMARY OSTEOARTHRITIS OF RIGHT KNEE: Primary | ICD-10-CM

## 2025-05-08 DIAGNOSIS — M17.12 PRIMARY OSTEOARTHRITIS OF LEFT KNEE: ICD-10-CM

## 2025-05-08 RX ORDER — TRIAMCINOLONE ACETONIDE 40 MG/ML
40 INJECTION, SUSPENSION INTRA-ARTICULAR; INTRAMUSCULAR ONCE
Status: COMPLETED | OUTPATIENT
Start: 2025-05-08 | End: 2025-05-08

## 2025-05-08 RX ADMIN — TRIAMCINOLONE ACETONIDE 40 MG: 40 INJECTION, SUSPENSION INTRA-ARTICULAR; INTRAMUSCULAR at 15:40

## 2025-05-08 NOTE — PROGRESS NOTES
Name: Phi River Jr.  YOB: 1965  Gender: male  MRN: [id number]    CC: Knee Pain (B)     HPI: Phi River Jr. is a 60 y.o. male who returns for follow up on both knees. He would like to have injections in both knees today.    He received Euflexxa series in the left knee which concluded in March 2025.  The left knee injections concluded in February 2025.  He states the shots helped significantly for about a month and then the pain returned.  He would like a cortisone injection today.    Physical Examination:  General: no acute distress  Lungs: breathing easily  CV: regular rhythm by pulse  Right Knee and Left Knee:  Range of motion 0 to 120 degrees.  Ligamentously stable x 4.  Calf is soft nontender to palpation.  Dorsi and plantarflexion mechanism intact.  Patella tracks centrally 2+ patellofemoral grind noted.     Assessment:     ICD-10-CM    1. Primary osteoarthritis of right knee  M17.11 DRAIN/INJECT LARGE JOINT/BURSA     DRAIN/INJECT LARGE JOINT/BURSA     triamcinolone acetonide (KENALOG-40) injection 40 mg     triamcinolone acetonide (KENALOG-40) injection 40 mg      2. Primary osteoarthritis of left knee  M17.12 DRAIN/INJECT LARGE JOINT/BURSA     DRAIN/INJECT LARGE JOINT/BURSA     triamcinolone acetonide (KENALOG-40) injection 40 mg     triamcinolone acetonide (KENALOG-40) injection 40 mg        Plan:     Discussed with Mr. River that we could go ahead and refer him to total joints if the injections are no longer helping.  He does need another round of x-rays because his last x-rays from us were from April 2024.  I would like to see how the steroid injections help before he proceed with this referral which we briefly discussed today.  Will proceed with another round cortisone injections today.    Carine Toney PA-C   Orthopaedics and Sports Medicine

## 2025-05-23 ENCOUNTER — TELEPHONE (OUTPATIENT)
Dept: ORTHOPEDIC SURGERY | Age: 60
End: 2025-05-23

## 2025-05-23 ENCOUNTER — OFFICE VISIT (OUTPATIENT)
Dept: VASCULAR SURGERY | Age: 60
End: 2025-05-23
Payer: MEDICARE

## 2025-05-23 VITALS
SYSTOLIC BLOOD PRESSURE: 136 MMHG | HEART RATE: 62 BPM | DIASTOLIC BLOOD PRESSURE: 82 MMHG | HEIGHT: 73 IN | BODY MASS INDEX: 28.23 KG/M2 | OXYGEN SATURATION: 98 % | WEIGHT: 213 LBS

## 2025-05-23 DIAGNOSIS — M47.816 LUMBAR SPONDYLOSIS: ICD-10-CM

## 2025-05-23 DIAGNOSIS — I73.9 PVD (PERIPHERAL VASCULAR DISEASE) WITH CLAUDICATION: Primary | ICD-10-CM

## 2025-05-23 DIAGNOSIS — M54.16 LUMBAR RADICULOPATHY: Primary | ICD-10-CM

## 2025-05-23 DIAGNOSIS — M48.062 SPINAL STENOSIS OF LUMBAR REGION WITH NEUROGENIC CLAUDICATION: ICD-10-CM

## 2025-05-23 PROCEDURE — 3017F COLORECTAL CA SCREEN DOC REV: CPT | Performed by: SURGERY

## 2025-05-23 PROCEDURE — G8417 CALC BMI ABV UP PARAM F/U: HCPCS | Performed by: SURGERY

## 2025-05-23 PROCEDURE — 1036F TOBACCO NON-USER: CPT | Performed by: SURGERY

## 2025-05-23 PROCEDURE — 99213 OFFICE O/P EST LOW 20 MIN: CPT | Performed by: SURGERY

## 2025-05-23 PROCEDURE — G8427 DOCREV CUR MEDS BY ELIG CLIN: HCPCS | Performed by: SURGERY

## 2025-05-23 NOTE — PROGRESS NOTES
[address] [address]  [phone] FAX: [phone]    Phi River JrMacho  : 1965    Chief Complaint:     History of Present Illness:   Patient follows up today for follow-up duplex study.  Patient is well-known 1.5 cm right popliteal artery aneurysm.  Denies any claudication or rest pain symptoms.    CURRENT MEDICATIONS:  Current Outpatient Medications   Medication Sig Dispense Refill    gabapentin (NEURONTIN) 800 MG tablet Take 1 tablet by mouth 4 times daily. 360 tablet 3    losartan-hydroCHLOROthiazide (HYZAAR) 100-12.5 MG per tablet Take 1 tablet by mouth daily 90 tablet 2    diclofenac (VOLTAREN) 75 MG EC tablet Take 1 tablet by mouth 2 times daily 60 tablet 1    promethazine (PHENERGAN) 25 MG tablet Take 1 tablet by mouth daily as needed for Nausea 30 tablet 3    valACYclovir (VALTREX) 500 MG tablet Take 1 tablet by mouth 2 times daily 180 tablet 3     No current facility-administered medications for this visit.       Past Medical History:   Diagnosis Date    Backache, unspecified 2015    Chronic back pain     Neuropathy     Pain in joint, multiple sites 2015    Positive sm/RNP antibody 2015       Physical Examination:   Height: 1.854 m (6' 1\"), Weight - Scale: 96.6 kg (213 lb), BP: 136/82    Constitutional: he appears well-developed. No distress.   HENT:   Head: Atraumatic.   Eyes: Pupils are equal, round, and reactive to light.   Neck: Normal range of motion.   Cardiovascular: Regular rhythm.    Pulmonary/Chest: Effort normal and breath sounds normal. No respiratory distress.   Abdominal: Soft. Bowel sounds are normal. he exhibits no distension. There is no tenderness. There is no guarding. No hernia.   Musculoskeletal: Normal range of motion.   Neurological: He is alert. CN II- XII grossly intact   Vascular: Palpable femoral pulses Doppler pedal pulses    Imaging:  ABIs 1 toe pressure 94 with a 1.4 to 1.7 cm popliteal artery aneurysm  ABIs of

## 2025-05-28 ENCOUNTER — OFFICE VISIT (OUTPATIENT)
Dept: ORTHOPEDIC SURGERY | Age: 60
End: 2025-05-28
Payer: MEDICARE

## 2025-05-28 DIAGNOSIS — M54.16 LUMBAR RADICULOPATHY: Primary | ICD-10-CM

## 2025-05-28 DIAGNOSIS — M47.816 LUMBAR SPONDYLOSIS: ICD-10-CM

## 2025-05-28 DIAGNOSIS — M48.062 SPINAL STENOSIS OF LUMBAR REGION WITH NEUROGENIC CLAUDICATION: ICD-10-CM

## 2025-05-28 PROCEDURE — 64483 NJX AA&/STRD TFRM EPI L/S 1: CPT | Performed by: PHYSICAL MEDICINE & REHABILITATION

## 2025-05-28 RX ORDER — TRIAMCINOLONE ACETONIDE 40 MG/ML
40 INJECTION, SUSPENSION INTRA-ARTICULAR; INTRAMUSCULAR ONCE
Status: COMPLETED | OUTPATIENT
Start: 2025-05-28 | End: 2025-05-28

## 2025-05-28 RX ADMIN — TRIAMCINOLONE ACETONIDE 40 MG: 40 INJECTION, SUSPENSION INTRA-ARTICULAR; INTRAMUSCULAR at 10:18

## 2025-05-28 NOTE — PROGRESS NOTES
Name: Phi River Jr.  YOB: 1965  Gender: male  MRN: [id number]        Transforaminal CARMEL Procedure Note    Procedure: Bilateral  L5-S1 transforaminal epidural steroid injections     Precautions: Phi River Jr. denies prior sensitivity to steroid, local anesthetic, iodine, or shellfish.       Consent:  Consent was obtained prior to the procedure. The procedure was discussed at length with Phi River Jr.. He was given the opportunity to ask questions regarding the procedure and its associated risks.  In addition to the potential risks associated with the procedure itself, the patient was informed both verbally and in writing of potential side effects of the use glucocorticoids.  The patient appeared to comprehend the informed consent and desired to have the procedure performed, and informed consent was signed.     He was placed in a prone position on the fluoroscopy table and the skin was prepped and draped in a routine sterile fashion. The areas to be injected were each anesthetized with 1 ml of 1% Lidocaine. A 22 gauge 3.5 inch spinal needle was carefully advanced under fluoroscopic guidance to the right L5-S1 transforaminal space  0.5 ml of 70% of Omnipaque was injected to confirm proper needle placement and absence of subdural or vascular flow Once proper placement was confirmed, 0.5ml of 0.25 marcaine and 40 mg kenalog were injected through the spinal needle.       The above procedure was then repeated at the Left  L5-S1 transforaminal space.    Fluoroscopic guidance was used intermittently over a 10-minute period to insure proper needle placement and his safety. A hard copy of the fluoroscopic image has been placed in his chart and is saved on the C-arm hard drive. He was monitored after the procedure and discharged home in a stable fashion with a routine follow up.    Procedural Diagnosis:     ICD-10-CM    1. Lumbar radiculopathy  M54.16 FL NERVE BLOCK LUMBOSACRAL 1ST

## 2025-07-30 ENCOUNTER — OFFICE VISIT (OUTPATIENT)
Dept: ORTHOPEDIC SURGERY | Age: 60
End: 2025-07-30

## 2025-07-30 DIAGNOSIS — M17.12 PRIMARY OSTEOARTHRITIS OF LEFT KNEE: ICD-10-CM

## 2025-07-30 DIAGNOSIS — M17.11 PRIMARY OSTEOARTHRITIS OF RIGHT KNEE: ICD-10-CM

## 2025-07-30 DIAGNOSIS — M75.41 SHOULDER IMPINGEMENT SYNDROME, RIGHT: ICD-10-CM

## 2025-07-30 DIAGNOSIS — M75.42 SHOULDER IMPINGEMENT SYNDROME, LEFT: Primary | ICD-10-CM

## 2025-07-30 RX ORDER — TRIAMCINOLONE ACETONIDE 40 MG/ML
40 INJECTION, SUSPENSION INTRA-ARTICULAR; INTRAMUSCULAR ONCE
Status: COMPLETED | OUTPATIENT
Start: 2025-07-30 | End: 2025-07-30

## 2025-07-30 RX ADMIN — TRIAMCINOLONE ACETONIDE 40 MG: 40 INJECTION, SUSPENSION INTRA-ARTICULAR; INTRAMUSCULAR at 10:39

## 2025-07-30 NOTE — PROGRESS NOTES
Name: Phi River Jr.  YOB: 1965  Gender: male  MRN: [id number]    CC: Shoulder Pain (B) and Knee Pain (B)     HPI: Phi River Jr. is a 60 y.o. male who returns for follow up for Shoulder Pain (B) and Knee Pain (B)    B Shoulders: The patient is requesting a steroid injection today. At their last appointment on 4/29/25, they received a SA injection with good relief for 2 months.  He notes with the injection he is 100% better and recently has overdone it and would like another round of injections.  He is not very happy with his improvement from the subacromial injections.    B Knees: As the last appoint with me in May we did another round of steroid injections.  He notes that they lasted for about a month and a half with 100% improvement.  He also has completed a round of viscosupplementation that only helped for about 2 weeks.  He notes 100% improvement when these injections do help.  He continues have aching pain when standing and walking for long periods of time.  He would like to discuss total knee replacements today.    Physical Examination:  General: no acute distress  Lungs: breathing easily  CV: regular rhythm by pulse  Right Shoulder and Left Shoulder: Full shoulder range of motion present.  5/5 rotator cuff strength.  Positive impingement signs.  Tender palpate over the biceps tendon.  Positive speeds.  Negative Anthony's.  Motor sensor intact distally.  Distal radial pulse 2+.    Right Knee and Left Knee: Tender to palpate along the medial joint line bilaterally.  No joint effusion noted today.  Ligamentously stable x 4.  Range of motion 0 to 130 degrees.  Calf is soft and nontender to palpation.  Dorsi and plantarflexion mechanism intact.  Dorsalis is pulse 2+.    Imaging:    Knee XR: 4 views     Clinical Indication  1. Shoulder impingement syndrome, left    2. Shoulder impingement syndrome, right    3. Primary osteoarthritis of right knee    4. Primary osteoarthritis of left knee

## 2025-08-04 ENCOUNTER — COMMUNITY OUTREACH (OUTPATIENT)
Dept: FAMILY MEDICINE CLINIC | Facility: CLINIC | Age: 60
End: 2025-08-04

## 2025-08-05 ENCOUNTER — TELEPHONE (OUTPATIENT)
Dept: ORTHOPEDIC SURGERY | Age: 60
End: 2025-08-05

## 2025-08-07 ENCOUNTER — OFFICE VISIT (OUTPATIENT)
Dept: ORTHOPEDIC SURGERY | Age: 60
End: 2025-08-07
Payer: MEDICARE

## 2025-08-07 DIAGNOSIS — M54.16 LUMBAR RADICULOPATHY: ICD-10-CM

## 2025-08-07 DIAGNOSIS — M47.816 LUMBAR SPONDYLOSIS: Primary | ICD-10-CM

## 2025-08-07 DIAGNOSIS — M48.062 SPINAL STENOSIS OF LUMBAR REGION WITH NEUROGENIC CLAUDICATION: ICD-10-CM

## 2025-08-07 PROCEDURE — 99214 OFFICE O/P EST MOD 30 MIN: CPT | Performed by: PHYSICIAN ASSISTANT

## 2025-08-07 PROCEDURE — G2211 COMPLEX E/M VISIT ADD ON: HCPCS | Performed by: PHYSICIAN ASSISTANT

## 2025-08-07 PROCEDURE — 3017F COLORECTAL CA SCREEN DOC REV: CPT | Performed by: PHYSICIAN ASSISTANT

## 2025-08-07 PROCEDURE — G8417 CALC BMI ABV UP PARAM F/U: HCPCS | Performed by: PHYSICIAN ASSISTANT

## 2025-08-07 PROCEDURE — G8428 CUR MEDS NOT DOCUMENT: HCPCS | Performed by: PHYSICIAN ASSISTANT

## 2025-08-07 PROCEDURE — 1036F TOBACCO NON-USER: CPT | Performed by: PHYSICIAN ASSISTANT

## 2025-08-21 ENCOUNTER — OFFICE VISIT (OUTPATIENT)
Dept: ORTHOPEDIC SURGERY | Age: 60
End: 2025-08-21
Payer: MEDICARE

## 2025-08-21 DIAGNOSIS — M48.062 SPINAL STENOSIS OF LUMBAR REGION WITH NEUROGENIC CLAUDICATION: ICD-10-CM

## 2025-08-21 DIAGNOSIS — M54.16 LUMBAR RADICULOPATHY: ICD-10-CM

## 2025-08-21 DIAGNOSIS — M47.816 LUMBAR SPONDYLOSIS: Primary | ICD-10-CM

## 2025-08-21 PROCEDURE — 64483 NJX AA&/STRD TFRM EPI L/S 1: CPT | Performed by: PHYSICAL MEDICINE & REHABILITATION

## 2025-08-21 RX ORDER — TRIAMCINOLONE ACETONIDE 40 MG/ML
40 INJECTION, SUSPENSION INTRA-ARTICULAR; INTRAMUSCULAR ONCE
Status: COMPLETED | OUTPATIENT
Start: 2025-08-21 | End: 2025-08-21

## 2025-08-21 RX ADMIN — TRIAMCINOLONE ACETONIDE 40 MG: 40 INJECTION, SUSPENSION INTRA-ARTICULAR; INTRAMUSCULAR at 10:01

## (undated) DEVICE — SOFT SILICONE HYDROCELLULAR FOAM DRESSING WITH LOCK AWAY LAYER: Brand: ALLEVYN LIFE XL 21X21 CTN10

## (undated) DEVICE — DRAPE,SHOULDER,BEACH CHAIR,STERILE: Brand: MEDLINE

## (undated) DEVICE — IMMOBILIZER ORTH LIGHTWEIGHT LG UNIV 9X7 IN PREMIERPRO

## (undated) DEVICE — 4-PORT MANIFOLD: Brand: NEPTUNE 2

## (undated) DEVICE — BLADE SHV DIA4MM RED RESECT FOR GEN DEB REM OF PERIOST FR

## (undated) DEVICE — 90-S ACCELERATOR, SUCTION PROBE, NON-BENDABLE, MAX CUT LEVEL 11: Brand: SERFAS ENERGY

## (undated) DEVICE — AMD ANTIMICROBIAL GAUZE SPONGES,12 PLY USP TYPE VII, 0.2% POLYHEXAMETHYLENE BIGUANIDE HCI (PHMB): Brand: CURITY

## (undated) DEVICE — SUT ETHLN 3-0 18IN PS1 BLK --

## (undated) DEVICE — SHLDR ARTHRO PAYLOR,ROBERSON: Brand: MEDLINE INDUSTRIES, INC.

## (undated) DEVICE — STOCKINETTE ORTH W9XL36IN COT 2 PLY HLLW FOR HANDLING LMB

## (undated) DEVICE — PREP SKN CHLRAPRP APL 26ML STR --

## (undated) DEVICE — RESTRAINT UNIVERSAL HEAD DISP --

## (undated) DEVICE — [THREADED CANNULA.  DO NOT RESTERILIZE,  DO NOT USE IF PACKAGE IS DAMAGED]: Brand: DRI-LOK

## (undated) DEVICE — GARMENT,MEDLINE,DVT,INT,CALF,MED, GEN2: Brand: MEDLINE

## (undated) DEVICE — 3M™ COBAN™ SELF-ADHERENT WRAP, 1586S, STERILE, 6 IN X 5 YD (15 CM X 4,5 M), 12 ROLLS/CASE: Brand: 3M™ COBAN™

## (undated) DEVICE — INFLOW CASSETTE TUBING, DO NOT USE IF PACKAGE IS DAMAGED: Brand: CROSSFLOW

## (undated) DEVICE — NDL SPNE QNCKE 18GX3.5IN LF --

## (undated) DEVICE — OUTFLOW CASSETTE TUBING, DO NOT USE IF PACKAGE IS DAMAGED: Brand: CROSSFLOW

## (undated) DEVICE — FIRSTPASS ST SUTURE PASSER, SELF CAPTURE: Brand: FIRSTPASS

## (undated) DEVICE — SUTURE MCRYL SZ 3-0 L27IN ABSRB UD L19MM PS-2 3/8 CIR PRIM Y427H

## (undated) DEVICE — [AUGER BUR, ARTHROSCOPIC SHAVER BLADE,  DO NOT RESTERILIZE,  DO NOT USE IF PACKAGE IS DAMAGED,  KEEP DRY,  KEEP AWAY FROM SUNLIGHT]: Brand: FORMULA

## (undated) DEVICE — SOLUTION IRRIG 3000ML 0.9% SOD CHL FLX CONT 0797208] ICU MEDICAL INC]

## (undated) DEVICE — STRIP,CLOSURE,WOUND,MEDI-STRIP,1/2X4: Brand: MEDLINE